# Patient Record
Sex: FEMALE | URBAN - METROPOLITAN AREA
[De-identification: names, ages, dates, MRNs, and addresses within clinical notes are randomized per-mention and may not be internally consistent; named-entity substitution may affect disease eponyms.]

---

## 2018-11-30 ENCOUNTER — APPOINTMENT (OUTPATIENT)
Age: 48
Setting detail: DERMATOLOGY
End: 2018-12-01

## 2018-11-30 PROBLEM — C44.91 BASAL CELL CARCINOMA OF SKIN, UNSPECIFIED: Status: ACTIVE | Noted: 2018-11-30

## 2018-11-30 PROCEDURE — OTHER MOHS SURGERY PHONE CONSULTATION: OTHER

## 2018-11-30 NOTE — HPI: MOHS SURGERY CONSULTATION
Additional History: She plans to arrive alone and will perform her own wound care.\\n\\nReferred by Milli Carranza MD

## 2018-12-19 ENCOUNTER — APPOINTMENT (OUTPATIENT)
Age: 48
Setting detail: DERMATOLOGY
End: 2018-12-20

## 2018-12-19 DIAGNOSIS — Z71.89 OTHER SPECIFIED COUNSELING: ICD-10-CM

## 2018-12-19 PROBLEM — C44.319 BASAL CELL CARCINOMA OF SKIN OF OTHER PARTS OF FACE: Status: ACTIVE | Noted: 2018-12-19

## 2018-12-19 PROCEDURE — OTHER DIAGNOSIS COMMENT: OTHER

## 2018-12-19 PROCEDURE — OTHER RETURN TO REFERRING PROVIDER: OTHER

## 2018-12-19 PROCEDURE — OTHER CONSULTATION FOR MOHS SURGERY: OTHER

## 2018-12-19 PROCEDURE — 17311 MOHS 1 STAGE H/N/HF/G: CPT

## 2018-12-19 PROCEDURE — OTHER MOHS SURGERY: OTHER

## 2018-12-19 PROCEDURE — OTHER COUNSELING: OTHER

## 2018-12-19 PROCEDURE — 13132 CMPLX RPR F/C/C/M/N/AX/G/H/F: CPT

## 2018-12-19 PROCEDURE — OTHER MIPS QUALITY: OTHER

## 2018-12-19 NOTE — PROCEDURE: CONSULTATION FOR MOHS SURGERY
Incorporate Mauc In Note: No
Detail Level: Detailed
Size Of Lesion: 0.7
Body Location Override (Optional - Billing Will Still Be Based On Selected Body Map Location If Applicable): Right Lateral Superior Forehead
Name Of The Referring Provider For Procedure: Milli Carranza MD
X Size Of Lesion In Cm (Optional): 0.5

## 2018-12-19 NOTE — PROCEDURE: MIPS QUALITY
Detail Level: Detailed
Quality 110: Preventive Care And Screening: Influenza Immunization: Influenza Immunization previously received during influenza season
Quality 431: Preventive Care And Screening: Unhealthy Alcohol Use - Screening: Patient screened for unhealthy alcohol use using a single question and scores less than 2 times per year
Quality 143: Oncology: Medical And Radiation- Pain Intensity Quantified: Pain severity quantified, no pain present
Quality 226: Preventive Care And Screening: Tobacco Use: Screening And Cessation Intervention: Patient screened for tobacco and never smoked
Quality 130: Documentation Of Current Medications In The Medical Record: Current Medications Documented

## 2018-12-19 NOTE — PROCEDURE: MOHS SURGERY
Post-Care Instructions: The patient was provided with detailed verbal and written instructions for daily wound care, including use of H2O2 cleansing, followed by application of plain Vaseline and a bandage.  These instructions including Dr. Livingston's contact information should there be any questions or concerns.  The patient is not to engage in any heavy lifting, exercise, or swimming for the next week.  Should the patient develop any fevers, chills, bleeding, or pain not controlled by OTC analgesics, s/he should contact the office immediately.

## 2021-03-15 ENCOUNTER — TRANSCRIBE ORDERS (OUTPATIENT)
Dept: OTHER | Age: 51
End: 2021-03-15

## 2021-03-15 DIAGNOSIS — L71.9 ROSACEA: Primary | ICD-10-CM

## 2022-09-26 ENCOUNTER — TRANSFERRED RECORDS (OUTPATIENT)
Dept: HEALTH INFORMATION MANAGEMENT | Facility: CLINIC | Age: 52
End: 2022-09-26

## 2022-10-03 ENCOUNTER — TRANSFERRED RECORDS (OUTPATIENT)
Dept: HEALTH INFORMATION MANAGEMENT | Facility: CLINIC | Age: 52
End: 2022-10-03

## 2022-10-07 ENCOUNTER — TRANSFERRED RECORDS (OUTPATIENT)
Dept: HEALTH INFORMATION MANAGEMENT | Facility: CLINIC | Age: 52
End: 2022-10-07

## 2022-10-10 ENCOUNTER — TRANSFERRED RECORDS (OUTPATIENT)
Dept: HEALTH INFORMATION MANAGEMENT | Facility: CLINIC | Age: 52
End: 2022-10-10

## 2022-10-11 ENCOUNTER — MEDICAL CORRESPONDENCE (OUTPATIENT)
Dept: HEALTH INFORMATION MANAGEMENT | Facility: CLINIC | Age: 52
End: 2022-10-11

## 2022-10-12 ENCOUNTER — TRANSFERRED RECORDS (OUTPATIENT)
Dept: HEALTH INFORMATION MANAGEMENT | Facility: CLINIC | Age: 52
End: 2022-10-12

## 2022-10-20 ENCOUNTER — TRANSCRIBE ORDERS (OUTPATIENT)
Dept: OTHER | Age: 52
End: 2022-10-20

## 2022-10-20 DIAGNOSIS — Z47.89 AFTERCARE FOLLOWING SURGERY OF THE MUSCULOSKELETAL SYSTEM: Primary | ICD-10-CM

## 2022-10-21 ENCOUNTER — MEDICAL CORRESPONDENCE (OUTPATIENT)
Dept: HEALTH INFORMATION MANAGEMENT | Facility: CLINIC | Age: 52
End: 2022-10-21

## 2022-10-21 ENCOUNTER — TELEPHONE (OUTPATIENT)
Dept: ORTHOPEDICS | Facility: CLINIC | Age: 52
End: 2022-10-21

## 2022-10-21 NOTE — TELEPHONE ENCOUNTER
Called and spoke to patient. Patient informed me that she expects to be discharged on 11/1/22. Scheduled patient to see Dr. Honeycutt on 11/7/22. All questions were answered. Encouraged patient to call with any questions or concerns.    CHUCKIE Zazueta

## 2022-10-21 NOTE — TELEPHONE ENCOUNTER
Hello,  I'm with ortho con.  Pt has a referral to Dr. Honeycutt.  She has a periprosthetic knee infection following surgery.  Shes been hospitalized for 6 weeks on antibiotics.  I dont find a long enough appointment in the [facility] future, can you help?  She has referrals coming for two other depts too one of which would determine if she has a metal allergy.  Thank you.

## 2022-10-25 ENCOUNTER — TRANSCRIBE ORDERS (OUTPATIENT)
Dept: OTHER | Age: 52
End: 2022-10-25

## 2022-10-25 DIAGNOSIS — Z47.89 AFTERCARE FOLLOWING SURGERY OF THE MUSCULOSKELETAL SYSTEM: Primary | ICD-10-CM

## 2022-10-28 NOTE — TELEPHONE ENCOUNTER
FUTURE VISIT INFORMATION      FUTURE VISIT INFORMATION:    Date: 11.2.22    Time: 11:00    Location: Video  REFERRAL INFORMATION:    Referring provider:  Carlito    Referring providers clinic:  [facility]    Reason for visit/diagnosis  Metal allergy testing    RECORDS REQUESTED FROM:       Clinic name Comments Records Status   [facility] 10.3.22  Carlito CARDOSO

## 2022-11-01 ENCOUNTER — TRANSFERRED RECORDS (OUTPATIENT)
Dept: HEALTH INFORMATION MANAGEMENT | Facility: CLINIC | Age: 52
End: 2022-11-01

## 2022-11-01 NOTE — PROGRESS NOTES
"[facility] Dermato-allergology Note  Virtual visit: store and forward video ([employer] connected), start time: 1145, end time: 1215, date of images: n/a  Encounter Date: Nov 2, 2022  ____________________________________________    CC: Allergy Consult (Nayely is a video visit for possible metal allergy. Knee replacements in 2016. Infection in left knee joint Sept 2022, unknown cause (culture no answers, ). Per pt high inflammatory markers entire life./Has seen allergy but no answers to why facial and tongue swelling.)      HPI:  (Nov 2, 2022)  Ms. Nayely Wesley is a(n) 52 year old female who presents today as new patient for allergy consultation  - In the end of 2016 had bilateral knee replacement   - Has high inflammatory markers   - When she went to have knees replaced, orthopedic surgeon said that he thought she had autoimmune disease   - Since knees replaced in 2016, both legs are burning at night almost every night   - \"Feels like something attacking me from inside out\"  - Knees have been okay   - Over the years, symmetric arthritis especially hips over the summer   - In September they went to a [employer] festival and woke up at night and this night it was the worst she ever felt   - Legs feel objectively \"burning and on fire\"  - At one point left knee was swollen up like a basketball   - They adam fluid off the leg but WBC was 50,000 so they did an emergency surgery  - status post left knee irrigation and debridement, left total knee explant, and antibiotic spacer placement preformed on 9/19/2022  - otherwise feeling well in usual state of health    Family history of lupus   > 8 miscarriages   History of polymalgia rheumatica     No eczema, no history of asthma or seasonal allergies   History of skin cancer    Never had a problem with fashion jewelery or metals   Recently has had some reaction to adhesives     Physical exam:  No photos available.     Past Medical History:   There is no problem list on " file for this patient.    No past medical history on file.    Allergies:  Not on File    Medications:  No current outpatient medications on file.     No current facility-administered medications for this visit.       Social History:  The patient worked as a senior travel planner.     Family History:  No family history on file.    Previous Labs, Allergy Tests, Dermatopathology, Imaging:  Recent BMP shows elevated Cr 1.10 with reduced GFR  WBC normal, Hgb low at 9.4   CRP 3.9 ESR >89        Referred By: No referring provider defined for this encounter.     Allergy Tests:    Past Allergy Test    Order for Future Allergy Testing:    [] Outpatient  [] Inpatient: [facility]..../ Bed ....       Skin Atopy (atopic dermatitis) [] Yes   [x] No .........  Contact allergies:   [x] Yes   [] No .implant material?.  Hand eczema:   [] Yes   [x] No           Leading hand:   [] R   [] L       [] Ambidextrous         Drug allergies:        [] Yes   [x] No  which?......    Urticaria/Angioedema  [] Yes   [x] No .........  Food Allergy:  [] Yes   [x] No  which?......  Pets :  [] Yes   [x] No  which?......         []  Rhinitis   [] Conjunctivitis   [] Sinusitis   [] Polyposis   [] Otitis   [] Pharyngitis         []  Postnasal drip    [x]  none  Operations:   [] Tonsils   [] Septum   [] Sinus   [] Polyposis        [] Asthma bronchiale   [] Coughing      []  none  Symptoms (mostly Rhinoconjunctivitis and Asthma) aggravated by:  Season   [] I   [] II   [] III   [] IV   []V   []VI   []VII   []VIII   []IX   []X   []XI   []XII     [] perennial   Day time      [] morning   [] noon      [] evening        [] night    [] whole day........  []  none  Location/changes    [] inside        [] outside   [] mountains    [] sea     [] others.............   []  none  Triggers, specific     [] animals     [] plants     [] dust              [] others ...........................    []  none  Triggers, others       [] work          [] psyche    [] sport             [] others .............................  []  none  Irritant                [] phys efforts [] smoke    [] heat/cold     [] odors  []others............... []  none    Order for PATCH TESTS  Reason for tests (suspected allergy): reaction to implant material?  Known previous allergies: none  Standardized panels  [x] Standard panel (40 tests)  [x] Preservatives & Antimicrobials (31 tests)  [] Emulsifiers & Additives (25 tests)   [] Perfumes/Flavours & Plants (25 tests)  [] Hairdresser panel (12 tests)   [] Rubber Chemicals (22 tests)  [x] Plastics (26 tests)  [] Colorants/Dyes/Food additives (20 tests)  [x] Metals (implants/dental) (24 tests)  [] Local anaesthetics/NSAIDs (13 tests)  [x] Antibiotics & Antimycotics (14 tests)   [] Corticosteroids (15 tests)   [] Photopatch test (62 tests)   [] others: ...      [] Patient's own products: ...    DO NOT test if chemical or biological identity is unknown!     always ask from patient the product information and safety sheets (MSDS)       Order for PRICK TESTS    Reason for tests (suspected allergy): not necessary  Known previous allergies: n/a    Standardized prick panels  [] Atopic panel (20 tests)  [] Pediatric Panel (12 tests)  [] Milk, Meat, Eggs, Grains (20 tests)   [] Dust, Epithelia, Feathers (10 tests)  [] Fish, Seafood, Shellfish (17 tests)  [] Nuts, Beans (14 tests)  [] Spice, Vegetable, Fruit (17 tests)  [] Pollen Panel = Tree, Grass, Weed (24 tests)  [] Others: ...      [] Patient's own products: ...    DO NOT test if chemical or biological identity is unknown!     always ask from patient the product information and safety sheets (MSDS)     Standardized intradermal tests  [] Penicillium notatum [] Aspergillus fumigatus [] House dust mites D.far & D. pteron  [] Cat    [] dog  [] Others: ...  [] Bee venom   [] Wasp venom  !!Specific protocol with dilutions!!       Order for Drug allergy tests (prick & Intradermal & patch tests)    [] Penicillin G  [] Ampicillin []  Cefazolin   [] Ceftriaxone   [] Ceftazidime  [] Bactrim    [] Others: ...  Order for ... as test date    [] Patient needs consultation with Allergy team (changes of tests may apply)  [x] Tests discussed with Allergy team (can have direct appointment for allergy tests)     ________________________________    Assessment & Plan:    ==> Final Diagnosis:     # Possible allergy to metal, cement, or other materials used in knee replacement.   - Patch testing (will need someone to read for her on Wednesday b/c lives 3 hours away)   * chronic illness with exacerbation, progression, side effects from treatment      These conclusions are made at the best of one's knowledge and belief based on the provided evidence such as patient's history and allergy test results and they can change over time or can be incomplete because of missing information's.    ==> Treatment Plan:  Monday (in person), Wednesday (photos), Friday (in person)     Staff and Resident:  Provider    Olinda Garzon MD     Staff Physician Comments:  I saw and evaluated the patient with the resident and I agree with the assessment and plan as documented in the resident's note.    Royce De Leon MD  Professor  Head of Dermato-Allergy Division  Department of Dermatology  [facility]      Follow-up in Derm-Allergy clinic for patch testing     I spent a total of 30 minutes with Nayely Wesley. This time was spent counseling the patient and/or coordinating care, explaining the allergy tests

## 2022-11-01 NOTE — TELEPHONE ENCOUNTER
Action November 1, 2022 4:02 PM MT   Action Taken Sent a request for images from [facility] [phone] and [facility] [phone].     Action November 4, 2022 10:55 AM MT   Action Taken Images from [facility] received and resovled to PACS by another user. Sent a request to [facility] for missing image [phone], with Fedex label: [id number].       DIAGNOSIS: periprosthetic LT knee infection following surgery   APPOINTMENT DATE: 11/07/2022   NOTES STATUS DETAILS   OFFICE NOTE from referring provider Care Everywhere 10/03/2022 - Ramu Esteban MD - [facility] Ortho   OFFICE NOTE from other specialist Care Everywhere 05/05/2022 - Ethan Rascon MD - [facility] Rheumatology   DISCHARGE SUMMARY from hospital Care Everywhere 09/18/2022 to 09/26/2022 - [facility]   OPERATIVE REPORT Care Everywhere 09/19/2022 -  LT Knee Irrigation and Debridement, Total Explant, and Antibiotic Spacer Placement   MEDICATION LIST Care Everywhere    IMPLANT RECORD/STICKER Care Everywhere    LABS     CULTURES Care Everywhere 09/19/2022, 09/18/2022   ULTRASOUND PACS [facility]:  10/10/2022 - LT Lower Extremity  09/18/2022 - LT Knee Arthrocentesis   03/17/2017 - LT Lower Extremity   XRAYS (IMAGES & REPORTS) PACS [facility]:  10/03/2022, 09/19/2022, 09/18/2022, 12/28/2020, 12/27/2018, 11/27/2017, 06/15/2017, 03/23/2017,   More.. - LT Knee  [facility]:  01/19/2016 - Leg Length

## 2022-11-02 ENCOUNTER — VIRTUAL VISIT (OUTPATIENT)
Dept: ALLERGY | Facility: CLINIC | Age: 52
End: 2022-11-02
Payer: COMMERCIAL

## 2022-11-02 ENCOUNTER — PRE VISIT (OUTPATIENT)
Dept: ALLERGY | Facility: CLINIC | Age: 52
End: 2022-11-02

## 2022-11-02 DIAGNOSIS — L23.89 ALLERGIC CONTACT DERMATITIS DUE TO OTHER AGENTS: Primary | ICD-10-CM

## 2022-11-02 PROCEDURE — 99203 OFFICE O/P NEW LOW 30 MIN: CPT | Mod: 95 | Performed by: DERMATOLOGY

## 2022-11-02 NOTE — Clinical Note
"    11/2/2022         RE: Nayely Wesley  [id number]  [place] [facility] [address]        Dear Colleague,    Thank you for referring your patient, Nayely Wesley, to the [facility] [place]. Please see a copy of my visit note below.    [facility] Dermato-allergology Note  Virtual visit: store and forward video ([employer]t connected), start time: 1145, end time: 1215, date of images: n/a  Encounter Date: Nov 2, 2022  ____________________________________________    CC: Allergy Consult (Nayely is a video visit for possible metal allergy. Knee replacements in 2016. Infection in left knee joint Sept 2022, unknown cause (culture no answers, ). Per pt high inflammatory markers entire life./Has seen allergy but no answers to why facial and tongue swelling.)      HPI:  (Nov 2, 2022)  Ms. Nayely Wesley is a(n) 52 year old female who presents today as new patient for allergy consultation  - In the end of 2016 had bilateral knee replacement   - Has high inflammatory markers   - When she went to have knees replaced, orthopedic surgeon said that he thought she had autoimmune disease   - Since knees replaced in 2016, both legs are burning at night almost every night   - \"Feels like something attacking me from inside out\"  - Knees have been okay   - Over the years, symmetric arthritis especially hips over the summer   - In September they went to a [employer] festival and woke up at night and this night it was the worst she ever felt   - Legs feel objectively \"burning and on fire\"  - At one point left knee was swollen up like a basketball   - They adam fluid off the leg but WBC was 50,000 so they did an emergency surgery  - status post left knee irrigation and debridement, left total knee explant, and antibiotic spacer placement preformed on 9/19/2022  - otherwise feeling well in usual state of health    Family history of lupus   > 8 miscarriages   History of polymalgia rheumatica     No eczema, no " history of asthma or seasonal allergies   History of skin cancer    Never had a problem with fashion jewelery or metals   Recently has had some reaction to adhesives     Physical exam:  No photos available.     Past Medical History:   There is no problem list on file for this patient.    No past medical history on file.    Allergies:  Not on File    Medications:  No current outpatient medications on file.     No current facility-administered medications for this visit.       Social History:  The patient worked as a senior travel planner.     Family History:  No family history on file.    Previous Labs, Allergy Tests, Dermatopathology, Imaging:  Recent BMP shows elevated Cr 1.10 with reduced GFR  WBC normal, Hgb low at 9.4   CRP 3.9 ESR >89        Referred By: No referring provider defined for this encounter.     Allergy Tests:    Past Allergy Test    Order for Future Allergy Testing:    [] Outpatient  [] Inpatient: [facility]..../ Bed ....       Skin Atopy (atopic dermatitis) [] Yes   [] No .........  Contact allergies:   [] Yes   [] No ..........  Hand eczema:   [] Yes   [] No           Leading hand:   [] R   [] L       [] Ambidextrous         Drug allergies:        [] Yes   [] No  which?......    Urticaria/Angioedema  [] Yes   [] No .........  Food Allergy:  [] Yes   [] No  which?......  Pets :  [] Yes   [] No  which?......         []  Rhinitis   [] Conjunctivitis   [] Sinusitis   [] Polyposis   [] Otitis   [] Pharyngitis         []  Postnasal drip    []  none  Operations:   [] Tonsils   [] Septum   [] Sinus   [] Polyposis        [] Asthma bronchiale   [] Coughing      []  none  Symptoms (mostly Rhinoconjunctivitis and Asthma) aggravated by:  Season   [] I   [] II   [] III   [] IV   []V   []VI   []VII   []VIII   []IX   []X   []XI   []XII     [] perennial   Day time      [] morning   [] noon      [] evening        [] night    [] whole day........  []  none  Location/changes    [] inside        [] outside   [] mountains     [] sea     [] others.............   []  none  Triggers, specific     [] animals     [] plants     [] dust              [] others ...........................    []  none  Triggers, others       [] work          [] psyche    [] sport            [] others .............................  []  none  Irritant                [] phys efforts [] smoke    [] heat/cold     [] odors  []others............... []  none    Order for PATCH TESTS  Reason for tests (suspected allergy): ***  Known previous allergies: ***  Standardized panels  [x] Standard panel (40 tests)  [x] Preservatives & Antimicrobials (31 tests)  [] Emulsifiers & Additives (25 tests)   [] Perfumes/Flavours & Plants (25 tests)  [] Hairdresser panel (12 tests)   [] Rubber Chemicals (22 tests)  [x] Plastics (26 tests)  [] Colorants/Dyes/Food additives (20 tests)  [x] Metals (implants/dental) (24 tests)  [] Local anaesthetics/NSAIDs (13 tests)  [x] Antibiotics & Antimycotics (14 tests)   [] Corticosteroids (15 tests)   [] Photopatch test (62 tests)   [] others: ...      [] Patient's own products: ...    DO NOT test if chemical or biological identity is unknown!     always ask from patient the product information and safety sheets (MSDS)       Order for PRICK TESTS    Reason for tests (suspected allergy): ***  Known previous allergies: ***    Standardized prick panels  [] Atopic panel (20 tests)  [] Pediatric Panel (12 tests)  [] Milk, Meat, Eggs, Grains (20 tests)   [] Dust, Epithelia, Feathers (10 tests)  [] Fish, Seafood, Shellfish (17 tests)  [] Nuts, Beans (14 tests)  [] Spice, Vegetable, Fruit (17 tests)  [] Pollen Panel = Tree, Grass, Weed (24 tests)  [] Others: ...      [] Patient's own products: ...    DO NOT test if chemical or biological identity is unknown!     always ask from patient the product information and safety sheets (MSDS)     Standardized intradermal tests  [] Penicillium notatum [] Aspergillus fumigatus [] House dust mites DMindyfar & DMindy pteron  []  Cat    [] dog  [] Others: ...  [] Bee venom   [] Wasp venom  !!Specific protocol with dilutions!!       Order for Drug allergy tests (prick & Intradermal & *** patch tests)    [] Penicillin G  [] Ampicillin [] Cefazolin   [] Ceftriaxone   [] Ceftazidime  [] Bactrim    [] Others: ...  Order for ... as test date    [] Patient needs consultation with Allergy team (changes of tests may apply)  [] Tests discussed with Allergy team (can have direct appointment for allergy tests)     ________________________________    Assessment & Plan:    ==> Final Diagnosis:     # Possible allergy to metal, cement, or other materials used in knee replacement.   - Patch testing (will need someone to read for her on Wednesday b/c lives 3 hours away)   {jgstatus:694450}    # ***  {jgstatus:701212}    # ***  {jgstatus:130844}  {jgallergytestfinal:659520}  - ***    These conclusions are made at the best of one's knowledge and belief based on the provided evidence such as patient's history and allergy test results and they can change over time or can be incomplete because of missing information's.    ==> Treatment Plan:  Monday (in person), Wednesday (photos), Friday (in person)    Procedures Performed: Allergy tests, including prick, intradermal and patch tests, drug allergy or provocation tests***     Staff and Resident:  Provider    Olinda Garzon MD     Follow-up in Derm-Allergy clinic for patch testing     I spent a total of [5:10:15:20:25:30:35:40:45:50:55:60:***] minutes with Nayely Wesley. This time was spent counseling the patient and/or coordinating care, explaining the allergy tests *** or procedures, performing allergy tests and assessing the clinical relevance.        Again, thank you for allowing me to participate in the care of your patient.        Sincerely,        Royce De Leon MD

## 2022-11-04 ENCOUNTER — DOCUMENTATION ONLY (OUTPATIENT)
Dept: ORTHOPEDICS | Facility: CLINIC | Age: 52
End: 2022-11-04

## 2022-11-04 ENCOUNTER — TELEPHONE (OUTPATIENT)
Dept: ALLERGY | Facility: CLINIC | Age: 52
End: 2022-11-04

## 2022-11-04 NOTE — TELEPHONE ENCOUNTER
[facility] Call Center    Phone Message    May a detailed message be left on voicemail: yes     Reason for Call: Appointment Intake    Referring Provider Name: NA   Diagnosis and/or Symptoms: Patch test   Pt had Appt 11/02/22 and was told she will need to schedule an Appt for next week Monday in clinic Wednesday virtually and Friday in clinic.   Pt would like to schedule with her local Allergy to help take off patches with Dr. Declan Brooks [facility] [address] [facility] [address] phone [phone]  Please call pt to discuss. ASAP   Need week of 11/07/22  Thanks     Action Taken: Message routed to:  [facility] Allergy    Travel Screening: Not Applicable

## 2022-11-04 NOTE — PROGRESS NOTES
Surgical Hx     TOTAL KNEE ARTHROPLASTY Bilateral 12/19/2016   Procedure: ARTHROPLASTY TOTAL KNEE BILATERAL; Surgeon: Royce Lugo MD; Location: [facility] MAIN OR     TOTAL KNEE ARTHROPLASTY Left 9/19/2022   Procedure: LEFT KNEE IRRIGATION AND DEBRIDEMENT, TOTAL EXPLANT AND ANTIBIOTIC SPACER PLACEMENT; Surgeon: Ramu Esteban MD; Location: [facility] OR     left total knee explant, and antibiotic spacer placement preformed on 9/19/2022     Frederick Robin, EMT on 11/4/2022 at 11:06 AM

## 2022-11-04 NOTE — TELEPHONE ENCOUNTER
Received call from call center. Informed that writer would inform Allergy pool. Informed allergy nursing staff of patient request. States they will call her when able.    See visit from 11/2 for more information.    TE already sent to allergy pool. Will not re-send at this time.    Arthur Pinto, EMT

## 2022-11-07 ENCOUNTER — OFFICE VISIT (OUTPATIENT)
Dept: ORTHOPEDICS | Facility: CLINIC | Age: 52
End: 2022-11-07
Payer: COMMERCIAL

## 2022-11-07 ENCOUNTER — PRE VISIT (OUTPATIENT)
Dept: ORTHOPEDICS | Facility: CLINIC | Age: 52
End: 2022-11-07

## 2022-11-07 VITALS — BODY MASS INDEX: 43.39 KG/M2 | WEIGHT: 270 LBS | HEIGHT: 66 IN

## 2022-11-07 DIAGNOSIS — E66.01 MORBID OBESITY (H): ICD-10-CM

## 2022-11-07 DIAGNOSIS — Z47.89 AFTERCARE FOLLOWING SURGERY OF THE MUSCULOSKELETAL SYSTEM: ICD-10-CM

## 2022-11-07 DIAGNOSIS — T84.54XA INFECTION OF TOTAL LEFT KNEE REPLACEMENT, INITIAL ENCOUNTER (H): Primary | ICD-10-CM

## 2022-11-07 PROCEDURE — 99204 OFFICE O/P NEW MOD 45 MIN: CPT | Mod: GC | Performed by: ORTHOPAEDIC SURGERY

## 2022-11-07 RX ORDER — METFORMIN HCL 500 MG
500 TABLET, EXTENDED RELEASE 24 HR ORAL DAILY
COMMUNITY
Start: 2022-03-07

## 2022-11-07 RX ORDER — LOSARTAN POTASSIUM 25 MG/1
1 TABLET ORAL DAILY
COMMUNITY
Start: 2022-08-01

## 2022-11-07 RX ORDER — ASPIRIN 325 MG
325 TABLET ORAL
COMMUNITY
Start: 2022-10-10

## 2022-11-07 RX ORDER — AMOXICILLIN 250 MG
2 CAPSULE ORAL
COMMUNITY
Start: 2022-11-01

## 2022-11-07 RX ORDER — AMOXICILLIN 500 MG/1
500 CAPSULE ORAL
Qty: 4 CAPSULE | Refills: 0 | Status: SHIPPED | OUTPATIENT
Start: 2022-11-07

## 2022-11-07 RX ORDER — METHOCARBAMOL 500 MG/1
500-1000 TABLET, FILM COATED ORAL
COMMUNITY
Start: 2021-03-04

## 2022-11-07 RX ORDER — CALCIUM CARBONATE 300MG(750)
400 TABLET,CHEWABLE ORAL
COMMUNITY

## 2022-11-07 RX ORDER — PREDNISONE 5 MG/1
1 TABLET ORAL DAILY
COMMUNITY
Start: 2021-01-12

## 2022-11-07 RX ORDER — TRAZODONE HYDROCHLORIDE 50 MG/1
1 TABLET, FILM COATED ORAL AT BEDTIME
COMMUNITY
Start: 2021-02-08

## 2022-11-07 RX ORDER — BUSPIRONE HYDROCHLORIDE 15 MG/1
15 TABLET ORAL
COMMUNITY

## 2022-11-07 RX ORDER — HYDROXYZINE PAMOATE 25 MG/1
CAPSULE ORAL
COMMUNITY
Start: 2022-11-01

## 2022-11-07 RX ORDER — OXYCODONE HYDROCHLORIDE 5 MG/1
TABLET ORAL
COMMUNITY
Start: 2022-11-01

## 2022-11-07 RX ORDER — SPIRONOLACTONE 25 MG/1
TABLET ORAL
COMMUNITY
Start: 2022-10-28

## 2022-11-07 RX ORDER — CETIRIZINE HYDROCHLORIDE 10 MG/1
10 TABLET ORAL 4 TIMES DAILY
COMMUNITY
Start: 2022-01-16

## 2022-11-07 RX ORDER — TORSEMIDE 20 MG/1
40 TABLET ORAL
COMMUNITY
Start: 2022-06-06

## 2022-11-07 ASSESSMENT — ACTIVITIES OF DAILY LIVING (ADL): FUNCTION,_DAILY_LIVING_SCORE: 44.12

## 2022-11-07 ASSESSMENT — KOOS JR: HOW SEVERE IS YOUR KNEE STIFFNESS AFTER FIRST WAKING IN MORNING: MILD

## 2022-11-07 NOTE — LETTER
11/7/2022         RE: Nayely Wesley  [address] [facility] [address]        Dear Colleague,    Thank you for referring your patient, Nayely Wesley, to the [facility]. Please see a copy of my visit note below.        [facility]  Orthopaedic Surgery, Joint Replacement Consultation  by Axel Honeycutt M.D.    Nayely Wesley MRN# [id number]    YOB: 1970     Requesting physician: Ramu Esteban  No primary care provider on file.            Assessment and Plan:   Assessment:  52-year-old female with left knee PJI status post explant and insertion of spacer.  She is currently on week 1 of antibiotic holiday     Plan:  1. Toe-touch weightbearing left lower extremity  2. Counseled the patient on the timeline of optimal treatment  3. We would like her to be on 8 weeks of antibiotic holiday  4. After the completion of antibiotic holiday, we will aspirate the knee.  If the aspiration is negative for infection, then we will proceed to the second stage of reimplantation.     Fabiano Ortiz MD  Adult Reconstructive Surgery Fellow  Department of Orthopaedic Surgery, [facility]               History of Present Illness:   52 year old female presents to the office today with a left knee PJI.  She will originally had bilateral TKA on 12/19/2016.  On 9/18/2022, she presented to the ER with concerns for left knee PJI.  Her knee was aspirated and she underwent surgery.  I&D and polyexchange was planned, but it was determined that her components were loose and an explant and insertion of antibiotic spacer was performed.  The patient experienced no complications.  No organisms grew from the cultures.  The patient had 6 weeks of vancomycin and ceftriaxone.  It has been 1 week since she was last on antibiotics.    Currently, the patient is in a knee immobilizer.  She is anxious and hopeful for the next steps.  She currently does not have any fevers or chills.   "Denies any numbness or tingling.  Denies any other concerns.      Background history:  DX:  1. Bilateral knee osteoarthritis  2. Left knee PJI    TREATMENTS:  1. 12/19/2016, bilateral TKA ([facility]), EH  2. 9/18/2022, L knee aspiration ([facility]), EH: 53k wbc, 90% neutrophils, no growth  3. 9/19/2022: L TKA explant, insertion of spacer (Lesmeister), EH           Physical Exam:     EXAMINATION pertinent findings:   PSYCH: Pleasant, healthy-appearing, alert, oriented x3, cooperative. Normal mood and affect.  VITAL SIGNS: Height 1.676 m (5' 6\"), weight 122.5 kg (270 lb)..  Reviewed nursing intake notes.   Body mass index is 43.58 kg/m .  RESP: non labored breathing   ABD: benign, soft, non-tender, no acute peritoneal findings  SKIN: grossly normal   LYMPHATIC: grossly normal, no adenopathy, no extremity edema  NEURO: grossly normal , no motor deficits  VASCULAR: satisfactory perfusion of all extremities   MUSCULOSKELETAL:   LLE:  In knee immobilizer  Skin intact  Incision well-healed  No ecchymosis or abrasions  No erythema, warmth, or effusion  No TTP  Fires TA/GS/P/FHL/EHL  SILT SP/DP/Saph/Sural/T  WWP, 2+ DP pulse               Data:   All laboratory data reviewed  All imaging studies reviewed by me       X-rays of the left knee from 10/31/2022 reviewed in the office showing left knee with antibiotic spacer in good position.        DATA for DOCUMENTATION:         Past Medical History:   There is no problem list on file for this patient.    No past medical history on file.    Also see scanned health assessment forms.       Past Surgical History:   No past surgical history on file.         Social History:     Social History     Socioeconomic History     Marital status:      Spouse name: Not on file     Number of children: Not on file     Years of education: Not on file     Highest education level: Not on file   Occupational History     Not on file   Tobacco Use     Smoking status: Not on file     Smokeless " tobacco: Not on file   Substance and Sexual Activity     Alcohol use: Not on file     Drug use: Not on file     Sexual activity: Not on file   Other Topics Concern     Not on file   Social History Narrative     Not on file     Social Determinants of Health     Financial Resource Strain: Not on file   Food Insecurity: Not on file   Transportation Needs: Not on file   Physical Activity: Not on file   Stress: Not on file   Social Connections: Not on file   Intimate Partner Violence: Not on file   Housing Stability: Not on file            Family History:     No family history on file.         Medications:     Current Outpatient Medications   Medication Sig     aspirin (ASA) 325 MG tablet Take 325 mg by mouth     busPIRone (BUSPAR) 15 MG tablet Take 15 mg by mouth     cetirizine (ZYRTEC) 10 MG tablet Take 10 mg by mouth 4 times daily     cholecalciferol 50 MCG (2000 UT) CAPS Take 2,000 Units by mouth     hydrOXYzine (VISTARIL) 25 MG capsule TAKE 1 CAPSULE BY MOUTH THREE TIMES DAILY AS NEEDED FOR MUSCLE CRAMPS     losartan (COZAAR) 25 MG tablet Take 1 tablet by mouth daily     Magnesium 400 MG TABS Take 400 mg by mouth     metFORMIN (GLUCOPHAGE XR) 500 MG 24 hr tablet Take 500 mg by mouth     methocarbamol (ROBAXIN) 500 MG tablet Take 500-1,000 mg by mouth     oxyCODONE (ROXICODONE) 5 MG tablet TAKE 1 TO 2 TABLETS BY MOUTH EVERY 4 HOURS AS NEEDED FOR PAIN     POTASSIUM CHLORIDE ER PO Take 1 tablet by mouth     predniSONE (DELTASONE) 5 MG tablet Take 1 tablet by mouth daily     Probiotic Product (PROBIOTIC-10 PO) Take 1 capsule by mouth daily     senna-docusate (SENOKOT-S/PERICOLACE) 8.6-50 MG tablet Take 2 tablets by mouth     spironolactone (ALDACTONE) 25 MG tablet TAKE 2 TABLETS BY MOUTH ONE TIME A DAY     torsemide (DEMADEX) 20 MG tablet Take 40 mg by mouth     linaclotide (LINZESS) 290 MCG capsule Take 290 mcg by mouth (Patient not taking: Reported on 11/7/2022)     traZODone (DESYREL) 50 MG tablet Take 1 tablet by  mouth At Bedtime (Patient not taking: Reported on 11/7/2022)     No current facility-administered medications for this visit.              Review of Systems:   A comprehensive 10 point review of systems (constitutional, ENT, cardiac, peripheral vascular, lymphatic, respiratory, GI, , Musculoskeletal, skin, Neurological) was performed and found to be negative except as described in this note.       HOOS Hip Dysfunction & Osteoarthritis Outcome Questionnaire    No flowsheet data found.           [unfilled]    KOOS Knee Survey Assessment    No flowsheet data found.           Promis 10 Assessment    No flowsheet data found.           Ortho Oxford Knee Questionnaire    No flowsheet data found.             See intake form completed by patient          [facility]  Orthopaedic Surgery, Joint Replacement Consultation  by Axel Honeycutt M.D.    Nayely Wesley MRN# [id number]    YOB: 1970     Requesting physician: Ramu Esteban  No primary care provider on file.            Assessment and Plan:   Assessment:  ***     Plan:  ***                 For additional information and frequently asked questions regarding joint replacements, scan the QR code image below on your phone camera:     or go to:   [email or web address]                  History of Present Illness:   52 year old female  chief complaint      Current symptoms:  Problem: Left knee periprosthetic knee infection  Onset and duration: Knee swelling and burning started on 09/18/2022  Awakens from sleep due to sx's:  Yes  Precipitating Injury:  No    Other joints or sites painful:  Yes, Bilateral hips and ankles      Background history:  DX:  3. ***    TREATMENTS:  4. December 19, 2016, Bilateral TKA, (Dr. Royce Lugo), [facility] orth[facility]ics ([facility]).  4. September 19, 2022,  Left knee irrigation and debriedment, Total explant and antibiotic spacer placement, (Dr. Rodriguez) [facility]  "Orthopedics               Physical Exam:     EXAMINATION pertinent findings:   PSYCH: Pleasant, healthy-appearing, alert, oriented x3, cooperative. Normal mood and affect.  VITAL SIGNS: Height 1.676 m (5' 6\"), weight 122.5 kg (270 lb)..  Reviewed nursing intake notes.   Body mass index is 43.58 kg/m .  RESP: non labored breathing   ABD: benign, soft, non-tender, no acute peritoneal findings  SKIN: grossly normal   LYMPHATIC: grossly normal, no adenopathy, no extremity edema  NEURO: grossly normal , no motor deficits  VASCULAR: satisfactory perfusion of all extremities   MUSCULOSKELETAL:   ***                  Data:   All laboratory data reviewed  All imaging studies reviewed by me               DATA for DOCUMENTATION:         Past Medical History:   There is no problem list on file for this patient.    No past medical history on file.    Also see scanned health assessment forms.       Past Surgical History:   No past surgical history on file.         Social History:     Social History     Socioeconomic History     Marital status:      Spouse name: Not on file     Number of children: Not on file     Years of education: Not on file     Highest education level: Not on file   Occupational History     Not on file   Tobacco Use     Smoking status: Not on file     Smokeless tobacco: Not on file   Substance and Sexual Activity     Alcohol use: Not on file     Drug use: Not on file     Sexual activity: Not on file   Other Topics Concern     Not on file   Social History Narrative     Not on file     Social Determinants of Health     Financial Resource Strain: Not on file   Food Insecurity: Not on file   Transportation Needs: Not on file   Physical Activity: Not on file   Stress: Not on file   Social Connections: Not on file   Intimate Partner Violence: Not on file   Housing Stability: Not on file            Family History:     No family history on file.         Medications:     Current Outpatient Medications "   Medication Sig     aspirin (ASA) 325 MG tablet Take 325 mg by mouth     busPIRone (BUSPAR) 15 MG tablet Take 15 mg by mouth     cetirizine (ZYRTEC) 10 MG tablet Take 10 mg by mouth 4 times daily     cholecalciferol 50 MCG (2000 UT) CAPS Take 2,000 Units by mouth     hydrOXYzine (VISTARIL) 25 MG capsule TAKE 1 CAPSULE BY MOUTH THREE TIMES DAILY AS NEEDED FOR MUSCLE CRAMPS     losartan (COZAAR) 25 MG tablet Take 1 tablet by mouth daily     Magnesium 400 MG TABS Take 400 mg by mouth     metFORMIN (GLUCOPHAGE XR) 500 MG 24 hr tablet Take 500 mg by mouth     methocarbamol (ROBAXIN) 500 MG tablet Take 500-1,000 mg by mouth     oxyCODONE (ROXICODONE) 5 MG tablet TAKE 1 TO 2 TABLETS BY MOUTH EVERY 4 HOURS AS NEEDED FOR PAIN     POTASSIUM CHLORIDE ER PO Take 1 tablet by mouth     predniSONE (DELTASONE) 5 MG tablet Take 1 tablet by mouth daily     Probiotic Product (PROBIOTIC-10 PO) Take 1 capsule by mouth daily     senna-docusate (SENOKOT-S/PERICOLACE) 8.6-50 MG tablet Take 2 tablets by mouth     spironolactone (ALDACTONE) 25 MG tablet TAKE 2 TABLETS BY MOUTH ONE TIME A DAY     torsemide (DEMADEX) 20 MG tablet Take 40 mg by mouth     linaclotide (LINZESS) 290 MCG capsule Take 290 mcg by mouth (Patient not taking: Reported on 11/7/2022)     traZODone (DESYREL) 50 MG tablet Take 1 tablet by mouth At Bedtime (Patient not taking: Reported on 11/7/2022)     No current facility-administered medications for this visit.              Review of Systems:   A comprehensive 10 point review of systems (constitutional, ENT, cardiac, peripheral vascular, lymphatic, respiratory, GI, , Musculoskeletal, skin, Neurological) was performed and found to be negative except as described in this note.       HOOS Hip Dysfunction & Osteoarthritis Outcome Questionnaire    No flowsheet data found.           [unfilled]    KOOS Knee Survey Assessment    No flowsheet data found.           Promis 10 Assessment    No flowsheet data found.           Ortho  Oxford Knee Questionnaire    No flowsheet data found.             See intake form completed by patient        Again, thank you for allowing me to participate in the care of your patient.        Sincerely,        Axel Honeycutt MD

## 2022-11-07 NOTE — PROGRESS NOTES
"    [facility]  Orthopaedic Surgery, Joint Replacement Consultation  by Axel Honeycutt M.D.    Nayely Wesley MRN# [id number]    YOB: 1970     Requesting physician: Royce Joyner  No primary care provider on file.            Assessment and Plan:   Assessment:  ***     Plan:  ***                 For additional information and frequently asked questions regarding joint replacements, scan the QR code image below on your phone camera:     or go to:   [email or web address]                  History of Present Illness:   52 year old female  chief complaint      Current symptoms:  Problem: Left knee periprosthetic knee infection  Onset and duration: Knee swelling and burning started on 09/18/2022  Awakens from sleep due to sx's:  Yes  Precipitating Injury:  No    Other joints or sites painful:  Yes, Bilateral hips and ankles      Background history:  DX:  1. ***    TREATMENTS:  1. December 19, 2016, Bilateral TKA, (Dr. Royce Lugo), [facility] orthopedics ([facility]  2. September 19, 2022,  Left knee irrigation and debriedment, Total explant and antibiotic spacer placement, (Dr. Rodriguez) [facility]               Physical Exam:     EXAMINATION pertinent findings:   PSYCH: Pleasant, healthy-appearing, alert, oriented x3, cooperative. Normal mood and affect.  VITAL SIGNS: Height 1.676 m (5' 6\"), weight 122.5 kg (270 lb)..  Reviewed nursing intake notes.   Body mass index is 43.58 kg/m .  RESP: non labored breathing   ABD: benign, soft, non-tender, no acute peritoneal findings  SKIN: grossly normal   LYMPHATIC: grossly normal, no adenopathy, no extremity edema  NEURO: grossly normal , no motor deficits  VASCULAR: satisfactory perfusion of all extremities   MUSCULOSKELETAL:   ***                  Data:   All laboratory data reviewed  All imaging studies reviewed by me               DATA for DOCUMENTATION:         Past Medical History: "   There is no problem list on file for this patient.    No past medical history on file.    Also see scanned health assessment forms.       Past Surgical History:   No past surgical history on file.         Social History:     Social History     Socioeconomic History     Marital status:      Spouse name: Not on file     Number of children: Not on file     Years of education: Not on file     Highest education level: Not on file   Occupational History     Not on file   Tobacco Use     Smoking status: Not on file     Smokeless tobacco: Not on file   Substance and Sexual Activity     Alcohol use: Not on file     Drug use: Not on file     Sexual activity: Not on file   Other Topics Concern     Not on file   Social History Narrative     Not on file     Social Determinants of Health     Financial Resource Strain: Not on file   Food Insecurity: Not on file   Transportation Needs: Not on file   Physical Activity: Not on file   Stress: Not on file   Social Connections: Not on file   Intimate Partner Violence: Not on file   Housing Stability: Not on file            Family History:     No family history on file.         Medications:     Current Outpatient Medications   Medication Sig     aspirin (ASA) 325 MG tablet Take 325 mg by mouth     busPIRone (BUSPAR) 15 MG tablet Take 15 mg by mouth     cetirizine (ZYRTEC) 10 MG tablet Take 10 mg by mouth 4 times daily     cholecalciferol 50 MCG (2000 UT) CAPS Take 2,000 Units by mouth     hydrOXYzine (VISTARIL) 25 MG capsule TAKE 1 CAPSULE BY MOUTH THREE TIMES DAILY AS NEEDED FOR MUSCLE CRAMPS     losartan (COZAAR) 25 MG tablet Take 1 tablet by mouth daily     Magnesium 400 MG TABS Take 400 mg by mouth     metFORMIN (GLUCOPHAGE XR) 500 MG 24 hr tablet Take 500 mg by mouth     methocarbamol (ROBAXIN) 500 MG tablet Take 500-1,000 mg by mouth     oxyCODONE (ROXICODONE) 5 MG tablet TAKE 1 TO 2 TABLETS BY MOUTH EVERY 4 HOURS AS NEEDED FOR PAIN     POTASSIUM CHLORIDE ER PO Take 1  tablet by mouth     predniSONE (DELTASONE) 5 MG tablet Take 1 tablet by mouth daily     Probiotic Product (PROBIOTIC-10 PO) Take 1 capsule by mouth daily     senna-docusate (SENOKOT-S/PERICOLACE) 8.6-50 MG tablet Take 2 tablets by mouth     spironolactone (ALDACTONE) 25 MG tablet TAKE 2 TABLETS BY MOUTH ONE TIME A DAY     torsemide (DEMADEX) 20 MG tablet Take 40 mg by mouth     linaclotide (LINZESS) 290 MCG capsule Take 290 mcg by mouth (Patient not taking: Reported on 11/7/2022)     traZODone (DESYREL) 50 MG tablet Take 1 tablet by mouth At Bedtime (Patient not taking: Reported on 11/7/2022)     No current facility-administered medications for this visit.              Review of Systems:   A comprehensive 10 point review of systems (constitutional, ENT, cardiac, peripheral vascular, lymphatic, respiratory, GI, , Musculoskeletal, skin, Neurological) was performed and found to be negative except as described in this note.       HOOS Hip Dysfunction & Osteoarthritis Outcome Questionnaire    No flowsheet data found.           [unfilled]    KOOS Knee Survey Assessment    No flowsheet data found.           Promis 10 Assessment    No flowsheet data found.           Ortho Oxford Knee Questionnaire    No flowsheet data found.             See intake form completed by patient

## 2022-11-07 NOTE — LETTER
11/7/2022         RE: Nayely Wesley  [address] [address] [address]        Dear Colleague,    Thank you for referring your patient, Nayely Wesley, to the [facility] [place]. Please see a copy of my visit note below.        [facility]  Orthopaedic Surgery, Joint Replacement Consultation  by Axel Honeycutt M.D.    Nayely Wesley MRN# [id number]    YOB: 1970     Requesting physician: Ramu Lugo  No primary care provider on file.            Assessment and Plan:   Assessment:  52-year-old female with left knee status post explant and insertion of spacer for either culture negative PJI or implant debris related inflammatory arthropathy.     Plan:  1. Toe-touch weightbearing left lower extremity.  Range of motion as tolerated  2. She may discontinue the knee immobilizer.  3. Counseled the patient on the timeline of optimal treatment  4. We would like her to be on 8 weeks of antibiotic holiday  5. The patient has a dental appointment on 11/20.  We recommend antibiotics for that dental appointment.  We also counseled the patient that if she takes antibiotics for that dental appointment, the 8-week antibiotic holiday will restart on that day.  6. After the completion of antibiotic holiday, we will aspirate the knee.  If the aspiration is negative for infection, then we will proceed to the second stage of reimplantation.  The patient will call us to make a follow-up appointment after the antibiotic holiday.  7. We will get XR of L knee at the follow-up visit  8. The patient is also on prednisone.  Her rheumatologist has not figured out what condition she has.  We recommend tapering off of the prednisone unless there is a good reason to be on it, given her PJI history      Fabiano Ortiz MD  Adult Reconstructive Surgery Fellow  Department of Orthopaedic Surgery, [facility]    Attending MD (Dr. Axel Honeycutt) Attestation :  This patient was  "seen and evaluated by me including a history, exam, and interpretation of all imaging and/or lab data.  I formulated the treatment plan along with either a physician's assistant (MAME) or training physician (resident/fellow), who also saw the patient. That individual or a scribe has documented the visit in the attached note which I approve.    MD Rg Crockett Family Professor  Oncology and Adult Reconstructive Surgery  Dept Orthopaedic Surgery, [facility]  [phone] office, [phone] pager  [email or web address]                   History of Present Illness:   52 year old female presents to the office today with a left knee PJI.  She will originally had bilateral TKA on 12/19/2016.  On 9/18/2022, she presented to the ER with concerns for left knee PJI.  Her knee was aspirated and she underwent surgery.  I&D and polyexchange was planned, but it was determined that her components were loose and an explant and insertion of antibiotic spacer was performed.  The patient experienced no complications.  No organisms grew from the cultures.  The patient had 6 weeks of vancomycin and ceftriaxone.  It has been 1 week since she was last on antibiotics.    Currently, the patient is in a knee immobilizer.  She is anxious and hopeful for the next steps.  She currently does not have any fevers or chills.  Denies any numbness or tingling.  Denies any other concerns.      Background history:  DX:  1. Bilateral knee osteoarthritis  2. Left knee PJI    TREATMENTS:  1. 12/19/2016, bilateral TKA ([facility]), EH  2. 9/18/2022, L knee aspiration (Lesmeister), EH: 53k wbc, 90% neutrophils, no growth  3. 9/19/2022: L TKA explant, insertion of spacer (Lesmeister), EH           Physical Exam:     EXAMINATION pertinent findings:   PSYCH: Pleasant, healthy-appearing, alert, oriented x3, cooperative. Normal mood and affect.  VITAL SIGNS: Height 1.676 m (5' 6\"), weight 122.5 kg (270 lb)..  Reviewed nursing intake notes.   Body mass " index is 43.58 kg/m .  RESP: non labored breathing   ABD: benign, soft, non-tender, no acute peritoneal findings  SKIN: grossly normal   LYMPHATIC: grossly normal, no adenopathy, no extremity edema  NEURO: grossly normal , no motor deficits  VASCULAR: satisfactory perfusion of all extremities   MUSCULOSKELETAL:   LLE:  In knee immobilizer  Skin intact  Incision well-healed  No ecchymosis or abrasions  No erythema, warmth, or effusion  No TTP  Fires TA/GS/P/FHL/EHL  SILT SP/DP/Saph/Sural/T  WWP, 2+ DP pulse               Data:   All laboratory data reviewed  All imaging studies reviewed by me       X-rays of the left knee from 10/31/2022 reviewed in the office showing left knee with antibiotic spacer in good position.        DATA for DOCUMENTATION:         Past Medical History:   There is no problem list on file for this patient.    No past medical history on file.    Also see scanned health assessment forms.       Past Surgical History:   No past surgical history on file.         Social History:     Social History     Socioeconomic History     Marital status:      Spouse name: Not on file     Number of children: Not on file     Years of education: Not on file     Highest education level: Not on file   Occupational History     Not on file   Tobacco Use     Smoking status: Not on file     Smokeless tobacco: Not on file   Substance and Sexual Activity     Alcohol use: Not on file     Drug use: Not on file     Sexual activity: Not on file   Other Topics Concern     Not on file   Social History Narrative     Not on file     Social Determinants of Health     Financial Resource Strain: Not on file   Food Insecurity: Not on file   Transportation Needs: Not on file   Physical Activity: Not on file   Stress: Not on file   Social Connections: Not on file   Intimate Partner Violence: Not on file   Housing Stability: Not on file            Family History:     No family history on file.         Medications:     Current  Outpatient Medications   Medication Sig     aspirin (ASA) 325 MG tablet Take 325 mg by mouth     busPIRone (BUSPAR) 15 MG tablet Take 15 mg by mouth     cetirizine (ZYRTEC) 10 MG tablet Take 10 mg by mouth 4 times daily     cholecalciferol 50 MCG (2000 UT) CAPS Take 2,000 Units by mouth     hydrOXYzine (VISTARIL) 25 MG capsule TAKE 1 CAPSULE BY MOUTH THREE TIMES DAILY AS NEEDED FOR MUSCLE CRAMPS     losartan (COZAAR) 25 MG tablet Take 1 tablet by mouth daily     Magnesium 400 MG TABS Take 400 mg by mouth     metFORMIN (GLUCOPHAGE XR) 500 MG 24 hr tablet Take 500 mg by mouth     methocarbamol (ROBAXIN) 500 MG tablet Take 500-1,000 mg by mouth     oxyCODONE (ROXICODONE) 5 MG tablet TAKE 1 TO 2 TABLETS BY MOUTH EVERY 4 HOURS AS NEEDED FOR PAIN     POTASSIUM CHLORIDE ER PO Take 1 tablet by mouth     predniSONE (DELTASONE) 5 MG tablet Take 1 tablet by mouth daily     Probiotic Product (PROBIOTIC-10 PO) Take 1 capsule by mouth daily     senna-docusate (SENOKOT-S/PERICOLACE) 8.6-50 MG tablet Take 2 tablets by mouth     spironolactone (ALDACTONE) 25 MG tablet TAKE 2 TABLETS BY MOUTH ONE TIME A DAY     torsemide (DEMADEX) 20 MG tablet Take 40 mg by mouth     linaclotide (LINZESS) 290 MCG capsule Take 290 mcg by mouth (Patient not taking: Reported on 11/7/2022)     traZODone (DESYREL) 50 MG tablet Take 1 tablet by mouth At Bedtime (Patient not taking: Reported on 11/7/2022)     No current facility-administered medications for this visit.              Review of Systems:   A comprehensive 10 point review of systems (constitutional, ENT, cardiac, peripheral vascular, lymphatic, respiratory, GI, , Musculoskeletal, skin, Neurological) was performed and found to be negative except as described in this note.       HOOS Hip Dysfunction & Osteoarthritis Outcome Questionnaire    No flowsheet data found.           [unfilled]    KOOS Knee Survey Assessment    No flowsheet data found.           Promis 10 Assessment    No flowsheet data  found.           Ortho Oxford Knee Questionnaire    No flowsheet data found.             See intake form completed by patient        Again, thank you for allowing me to participate in the care of your patient.        Sincerely,        Axel Honeycutt MD

## 2022-11-07 NOTE — LETTER
11/7/2022         RE: Nayely Wesley  [address] [address] [address]        Dear Colleague,    Thank you for referring your patient, Nayely Wesley, to the [facility] [place]. Please see a copy of my visit note below.        [facility]  Orthopaedic Surgery, Joint Replacement Consultation  by Axel Honeycutt M.D.    Nayely Wesley MRN# [id number]    YOB: 1970     Requesting physician: Ramu Lugo  No primary care provider on file.            Assessment and Plan:   Assessment:  52-year-old female with left knee status post explant and insertion of spacer for either culture negative PJI or implant debris related inflammatory arthropathy.     Plan:  1. Toe-touch weightbearing left lower extremity.  Range of motion as tolerated  2. She may discontinue the knee immobilizer.  3. Counseled the patient on the timeline of optimal treatment  4. We would like her to be on 8 weeks of antibiotic holiday  5. The patient has a dental appointment on 11/20.  We recommend antibiotics for that dental appointment.  We also counseled the patient that if she takes antibiotics for that dental appointment, the 8-week antibiotic holiday will restart on that day.  6. After the completion of antibiotic holiday, we will aspirate the knee.  If the aspiration is negative for infection, then we will proceed to the second stage of reimplantation.  The patient will call us to make a follow-up appointment after the antibiotic holiday.  7. We will get XR of L knee at the follow-up visit  8. The patient is also on prednisone.  Her rheumatologist has not figured out what condition she has.  We recommend tapering off of the prednisone unless there is a good reason to be on it, given her PJI history      Fabiano Ortiz MD  Adult Reconstructive Surgery Fellow  Department of Orthopaedic Surgery, [facility]    Attending MD (Dr. Axel Honeycutt) Attestation :  This patient was  "seen and evaluated by me including a history, exam, and interpretation of all imaging and/or lab data.  I formulated the treatment plan along with either a physician's assistant (MAME) or training physician (resident/fellow), who also saw the patient. That individual or a scribe has documented the visit in the attached note which I approve.    MD Rg Crockett Family Professor  Oncology and Adult Reconstructive Surgery  Dept Orthopaedic Surgery, [facility]  [phone] office, [phone] pager  [email or web address]                   History of Present Illness:   52 year old female presents to the office today with a left knee PJI.  She will originally had bilateral TKA on 12/19/2016.  On 9/18/2022, she presented to the ER with concerns for left knee PJI.  Her knee was aspirated and she underwent surgery.  I&D and polyexchange was planned, but it was determined that her components were loose and an explant and insertion of antibiotic spacer was performed.  The patient experienced no complications.  No organisms grew from the cultures.  The patient had 6 weeks of vancomycin and ceftriaxone.  It has been 1 week since she was last on antibiotics.    Currently, the patient is in a knee immobilizer.  She is anxious and hopeful for the next steps.  She currently does not have any fevers or chills.  Denies any numbness or tingling.  Denies any other concerns.      Background history:  DX:  1. Bilateral knee osteoarthritis  2. Left knee PJI    TREATMENTS:  1. 12/19/2016, bilateral TKA ([facility]), EH  2. 9/18/2022, L knee aspiration (Lesmeister), EH: 53k wbc, 90% neutrophils, no growth  3. 9/19/2022: L TKA explant, insertion of spacer (Lesmeister), EH           Physical Exam:     EXAMINATION pertinent findings:   PSYCH: Pleasant, healthy-appearing, alert, oriented x3, cooperative. Normal mood and affect.  VITAL SIGNS: Height 1.676 m (5' 6\"), weight 122.5 kg (270 lb)..  Reviewed nursing intake notes.   Body mass " index is 43.58 kg/m .  RESP: non labored breathing   ABD: benign, soft, non-tender, no acute peritoneal findings  SKIN: grossly normal   LYMPHATIC: grossly normal, no adenopathy, no extremity edema  NEURO: grossly normal , no motor deficits  VASCULAR: satisfactory perfusion of all extremities   MUSCULOSKELETAL:   LLE:  In knee immobilizer  Skin intact  Incision well-healed  No ecchymosis or abrasions  No erythema, warmth, or effusion  No TTP  Fires TA/GS/P/FHL/EHL  SILT SP/DP/Saph/Sural/T  WWP, 2+ DP pulse               Data:   All laboratory data reviewed  All imaging studies reviewed by me       X-rays of the left knee from 10/31/2022 reviewed in the office showing left knee with antibiotic spacer in good position.        DATA for DOCUMENTATION:         Past Medical History:   There is no problem list on file for this patient.    No past medical history on file.    Also see scanned health assessment forms.       Past Surgical History:   No past surgical history on file.         Social History:     Social History     Socioeconomic History     Marital status:      Spouse name: Not on file     Number of children: Not on file     Years of education: Not on file     Highest education level: Not on file   Occupational History     Not on file   Tobacco Use     Smoking status: Not on file     Smokeless tobacco: Not on file   Substance and Sexual Activity     Alcohol use: Not on file     Drug use: Not on file     Sexual activity: Not on file   Other Topics Concern     Not on file   Social History Narrative     Not on file     Social Determinants of Health     Financial Resource Strain: Not on file   Food Insecurity: Not on file   Transportation Needs: Not on file   Physical Activity: Not on file   Stress: Not on file   Social Connections: Not on file   Intimate Partner Violence: Not on file   Housing Stability: Not on file            Family History:     No family history on file.         Medications:     Current  Outpatient Medications   Medication Sig     aspirin (ASA) 325 MG tablet Take 325 mg by mouth     busPIRone (BUSPAR) 15 MG tablet Take 15 mg by mouth     cetirizine (ZYRTEC) 10 MG tablet Take 10 mg by mouth 4 times daily     cholecalciferol 50 MCG (2000 UT) CAPS Take 2,000 Units by mouth     hydrOXYzine (VISTARIL) 25 MG capsule TAKE 1 CAPSULE BY MOUTH THREE TIMES DAILY AS NEEDED FOR MUSCLE CRAMPS     losartan (COZAAR) 25 MG tablet Take 1 tablet by mouth daily     Magnesium 400 MG TABS Take 400 mg by mouth     metFORMIN (GLUCOPHAGE XR) 500 MG 24 hr tablet Take 500 mg by mouth     methocarbamol (ROBAXIN) 500 MG tablet Take 500-1,000 mg by mouth     oxyCODONE (ROXICODONE) 5 MG tablet TAKE 1 TO 2 TABLETS BY MOUTH EVERY 4 HOURS AS NEEDED FOR PAIN     POTASSIUM CHLORIDE ER PO Take 1 tablet by mouth     predniSONE (DELTASONE) 5 MG tablet Take 1 tablet by mouth daily     Probiotic Product (PROBIOTIC-10 PO) Take 1 capsule by mouth daily     senna-docusate (SENOKOT-S/PERICOLACE) 8.6-50 MG tablet Take 2 tablets by mouth     spironolactone (ALDACTONE) 25 MG tablet TAKE 2 TABLETS BY MOUTH ONE TIME A DAY     torsemide (DEMADEX) 20 MG tablet Take 40 mg by mouth     linaclotide (LINZESS) 290 MCG capsule Take 290 mcg by mouth (Patient not taking: Reported on 11/7/2022)     traZODone (DESYREL) 50 MG tablet Take 1 tablet by mouth At Bedtime (Patient not taking: Reported on 11/7/2022)     No current facility-administered medications for this visit.              Review of Systems:   A comprehensive 10 point review of systems (constitutional, ENT, cardiac, peripheral vascular, lymphatic, respiratory, GI, , Musculoskeletal, skin, Neurological) was performed and found to be negative except as described in this note.       HOOS Hip Dysfunction & Osteoarthritis Outcome Questionnaire    No flowsheet data found.           [unfilled]    KOOS Knee Survey Assessment    No flowsheet data found.           Promis 10 Assessment    No flowsheet data  found.           Ortho Oxford Knee Questionnaire    No flowsheet data found.             See intake form completed by patient        Again, thank you for allowing me to participate in the care of your patient.        Sincerely,        Axel Honeycutt MD

## 2022-11-07 NOTE — PROGRESS NOTES
[facility]  Orthopaedic Surgery, Joint Replacement Consultation  by Axel Honeycutt M.D.    Nayely Wesley MRN# [id number]    YOB: 1970     Requesting physician: Ramu Lugo  No primary care provider on file.            Assessment and Plan:   Assessment:  52-year-old female with left knee status post explant and insertion of spacer for either culture negative PJI or implant debris related inflammatory arthropathy.     Plan:  1. Toe-touch weightbearing left lower extremity.  Range of motion as tolerated  2. She may discontinue the knee immobilizer.  3. Counseled the patient on the timeline of optimal treatment  4. We would like her to be on 8 weeks of antibiotic holiday  5. The patient has a dental appointment on 11/20.  We recommend antibiotics for that dental appointment.  We also counseled the patient that if she takes antibiotics for that dental appointment, the 8-week antibiotic holiday will restart on that day.  6. After the completion of antibiotic holiday, we will aspirate the knee.  If the aspiration is negative for infection, then we will proceed to the second stage of reimplantation.  The patient will call us to make a follow-up appointment after the antibiotic holiday.  7. We will get XR of L knee at the follow-up visit  8. The patient is also on prednisone.  Her rheumatologist has not figured out what condition she has.  We recommend tapering off of the prednisone unless there is a good reason to be on it, given her PJI history      Fabiano Ortiz MD  Adult Reconstructive Surgery Fellow  Department of Orthopaedic Surgery, [facility]    Attending MD (Dr. Axel Honeycutt) Attestation :  This patient was seen and evaluated by me including a history, exam, and interpretation of all imaging and/or lab data.  I formulated the treatment plan along with either a physician's assistant (MAME) or training physician (resident/fellow), who also saw the patient. That  "individual or a scribe has documented the visit in the attached note which I approve.    Axel Honeycutt MD  [facility] Family Professor  Oncology and Adult Reconstructive Surgery  Dept Orthopaedic Surgery, [facility]  [phone] office, [phone] pager  [email or web address]                   History of Present Illness:   52 year old female presents to the office today with a left knee PJI.  She will originally had bilateral TKA on 12/19/2016.  On 9/18/2022, she presented to the ER with concerns for left knee PJI.  Her knee was aspirated and she underwent surgery.  I&D and polyexchange was planned, but it was determined that her components were loose and an explant and insertion of antibiotic spacer was performed.  The patient experienced no complications.  No organisms grew from the cultures.  The patient had 6 weeks of vancomycin and ceftriaxone.  It has been 1 week since she was last on antibiotics.    Currently, the patient is in a knee immobilizer.  She is anxious and hopeful for the next steps.  She currently does not have any fevers or chills.  Denies any numbness or tingling.  Denies any other concerns.      Background history:  DX:  1. Bilateral knee osteoarthritis  2. Left knee PJI    TREATMENTS:  1. 12/19/2016, bilateral TKA (Parish), EH  2. 9/18/2022, L knee aspiration (Lesmeister), EH: 53k wbc, 90% neutrophils, no growth  3. 9/19/2022: L TKA explant, insertion of spacer (Lesmeister), EH           Physical Exam:     EXAMINATION pertinent findings:   PSYCH: Pleasant, healthy-appearing, alert, oriented x3, cooperative. Normal mood and affect.  VITAL SIGNS: Height 1.676 m (5' 6\"), weight 122.5 kg (270 lb)..  Reviewed nursing intake notes.   Body mass index is 43.58 kg/m .  RESP: non labored breathing   ABD: benign, soft, non-tender, no acute peritoneal findings  SKIN: grossly normal   LYMPHATIC: grossly normal, no adenopathy, no extremity edema  NEURO: grossly normal , no motor deficits  VASCULAR: " satisfactory perfusion of all extremities   MUSCULOSKELETAL:   LLE:  In knee immobilizer  Skin intact  Incision well-healed  No ecchymosis or abrasions  No erythema, warmth, or effusion  No TTP  Fires TA/GS/P/FHL/EHL  SILT SP/DP/Saph/Sural/T  WWP, 2+ DP pulse               Data:   All laboratory data reviewed  All imaging studies reviewed by me       X-rays of the left knee from 10/31/2022 reviewed in the office showing left knee with antibiotic spacer in good position.        DATA for DOCUMENTATION:         Past Medical History:   There is no problem list on file for this patient.    No past medical history on file.    Also see scanned health assessment forms.       Past Surgical History:   No past surgical history on file.         Social History:     Social History     Socioeconomic History     Marital status:      Spouse name: Not on file     Number of children: Not on file     Years of education: Not on file     Highest education level: Not on file   Occupational History     Not on file   Tobacco Use     Smoking status: Not on file     Smokeless tobacco: Not on file   Substance and Sexual Activity     Alcohol use: Not on file     Drug use: Not on file     Sexual activity: Not on file   Other Topics Concern     Not on file   Social History Narrative     Not on file     Social Determinants of Health     Financial Resource Strain: Not on file   Food Insecurity: Not on file   Transportation Needs: Not on file   Physical Activity: Not on file   Stress: Not on file   Social Connections: Not on file   Intimate Partner Violence: Not on file   Housing Stability: Not on file            Family History:     No family history on file.         Medications:     Current Outpatient Medications   Medication Sig     aspirin (ASA) 325 MG tablet Take 325 mg by mouth     busPIRone (BUSPAR) 15 MG tablet Take 15 mg by mouth     cetirizine (ZYRTEC) 10 MG tablet Take 10 mg by mouth 4 times daily     cholecalciferol 50 MCG (2000  UT) CAPS Take 2,000 Units by mouth     hydrOXYzine (VISTARIL) 25 MG capsule TAKE 1 CAPSULE BY MOUTH THREE TIMES DAILY AS NEEDED FOR MUSCLE CRAMPS     losartan (COZAAR) 25 MG tablet Take 1 tablet by mouth daily     Magnesium 400 MG TABS Take 400 mg by mouth     metFORMIN (GLUCOPHAGE XR) 500 MG 24 hr tablet Take 500 mg by mouth     methocarbamol (ROBAXIN) 500 MG tablet Take 500-1,000 mg by mouth     oxyCODONE (ROXICODONE) 5 MG tablet TAKE 1 TO 2 TABLETS BY MOUTH EVERY 4 HOURS AS NEEDED FOR PAIN     POTASSIUM CHLORIDE ER PO Take 1 tablet by mouth     predniSONE (DELTASONE) 5 MG tablet Take 1 tablet by mouth daily     Probiotic Product (PROBIOTIC-10 PO) Take 1 capsule by mouth daily     senna-docusate (SENOKOT-S/PERICOLACE) 8.6-50 MG tablet Take 2 tablets by mouth     spironolactone (ALDACTONE) 25 MG tablet TAKE 2 TABLETS BY MOUTH ONE TIME A DAY     torsemide (DEMADEX) 20 MG tablet Take 40 mg by mouth     linaclotide (LINZESS) 290 MCG capsule Take 290 mcg by mouth (Patient not taking: Reported on 11/7/2022)     traZODone (DESYREL) 50 MG tablet Take 1 tablet by mouth At Bedtime (Patient not taking: Reported on 11/7/2022)     No current facility-administered medications for this visit.              Review of Systems:   A comprehensive 10 point review of systems (constitutional, ENT, cardiac, peripheral vascular, lymphatic, respiratory, GI, , Musculoskeletal, skin, Neurological) was performed and found to be negative except as described in this note.       HOOS Hip Dysfunction & Osteoarthritis Outcome Questionnaire    No flowsheet data found.           [unfilled]    KOOS Knee Survey Assessment    No flowsheet data found.           Promis 10 Assessment    No flowsheet data found.           Ortho Oxford Knee Questionnaire    No flowsheet data found.             See intake form completed by patient

## 2022-11-07 NOTE — NURSING NOTE
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 Referred by Dr Esteban. Pt had bilateral TKA in 2016. Pt states that about a year after surgery their knees were almost recovered, but both legs burned at night. 09/18/2022 Pt woke up from sleep with severe pain and an intense burning sensation in the left knee. In the morning, the patient's knee was swollen. She was admitted to the hospital with a high WBC count and Dr. Esteban removed the prosthetic and put in an antibiotic spacer.       52 year old  1970    Ht 1.676 m (5' 6\")   Wt 122.5 kg (270 lb)   BMI 43.58 kg/m      No past surgical history on file.        Pain Assessment  Patient Currently in Pain: Yes  0-10 Pain Scale: 4  Primary Pain Location: Knee (Left)               [id number] - [place], [place] - [address] AT NEC [place] [address] [place] [address]        Allergies   Allergen Reactions     Bupropion Hives     Hydrocodone-Acetaminophen Anaphylaxis     Pt had taken Vicodin, norflex and medrol all at same time  Pt had taken Vicodin, norflex and medrol all at same time  Pt had taken Vicodin, norflex and medrol all at same time       Gadolinium Derivatives Swelling     Cat Hair Extract Other (See Comments)     Difficult breathing     No Clinical Screening - See Comments      2009 - had Medrol pack, vicodin and norflex  together for pain med treatment - had tongue swell,  face swell and SOB symptoms - has taken each separate without problem. Do not use together.      Adhesive Tape Rash     Band aids           Current Outpatient Medications   Medication     aspirin (ASA) 325 MG tablet     busPIRone (BUSPAR) 15 MG tablet     cetirizine (ZYRTEC) 10 MG tablet     cholecalciferol 50 MCG (2000 UT) CAPS     hydrOXYzine (VISTARIL) 25 MG capsule     losartan (COZAAR) 25 MG tablet     Magnesium 400 MG TABS     metFORMIN (GLUCOPHAGE XR) 500 MG 24 hr tablet     methocarbamol " (ROBAXIN) 500 MG tablet     oxyCODONE (ROXICODONE) 5 MG tablet     POTASSIUM CHLORIDE ER PO     predniSONE (DELTASONE) 5 MG tablet     Probiotic Product (PROBIOTIC-10 PO)     senna-docusate (SENOKOT-S/PERICOLACE) 8.6-50 MG tablet     spironolactone (ALDACTONE) 25 MG tablet     torsemide (DEMADEX) 20 MG tablet     linaclotide (LINZESS) 290 MCG capsule     traZODone (DESYREL) 50 MG tablet     No current facility-administered medications for this visit.             Questionnaires:

## 2022-11-07 NOTE — LETTER
Date:November 8, 2022      Patient was self referred, no letter generated. Do not send.        [facility] Health Information

## 2022-11-07 NOTE — LETTER
11/7/2022         RE: Nayely Wesley  [address] [address] [address]        Dear Colleague,    Thank you for referring your patient, Nayely Wesley, to the [facility] [place]. Please see a copy of my visit note below.        [facility]  Orthopaedic Surgery, Joint Replacement Consultation  by Axel Honeycutt M.D.    Nayely Wesley MRN# [id number]    YOB: 1970     Requesting physician: Ramu Esteban  No primary care provider on file.            Assessment and Plan:   Assessment:  52-year-old female with left knee PJI status post explant and insertion of spacer.  She is currently on week 1 of antibiotic holiday     Plan:  1. Toe-touch weightbearing left lower extremity  2. Counseled the patient on the timeline of optimal treatment  3. We would like her to be on 8 weeks of antibiotic holiday  4. After the completion of antibiotic holiday, we will aspirate the knee.  If the aspiration is negative for infection, then we will proceed to the second stage of reimplantation.     Fabiano Ortiz MD  Adult Reconstructive Surgery Fellow  Department of Orthopaedic Surgery, [facility]               History of Present Illness:   52 year old female presents to the office today with a left knee PJI.  She will originally had bilateral TKA on 12/19/2016.  On 9/18/2022, she presented to the ER with concerns for left knee PJI.  Her knee was aspirated and she underwent surgery.  I&D and polyexchange was planned, but it was determined that her components were loose and an explant and insertion of antibiotic spacer was performed.  The patient experienced no complications.  No organisms grew from the cultures.  The patient had 6 weeks of vancomycin and ceftriaxone.  It has been 1 week since she was last on antibiotics.    Currently, the patient is in a knee immobilizer.  She is anxious and hopeful for the next steps.  She currently does not have any fevers or chills.   "Denies any numbness or tingling.  Denies any other concerns.      Background history:  DX:  1. Bilateral knee osteoarthritis  2. Left knee PJI    TREATMENTS:  1. 12/19/2016, bilateral TKA ([facility]ish), EH  2. 9/18/2022, L knee aspiration (Lesmeister), EH: 53k wbc, 90% neutrophils, no growth  3. 9/19/2022: L TKA explant, insertion of spacer (Lesmeister), EH           Physical Exam:     EXAMINATION pertinent findings:   PSYCH: Pleasant, healthy-appearing, alert, oriented x3, cooperative. Normal mood and affect.  VITAL SIGNS: Height 1.676 m (5' 6\"), weight 122.5 kg (270 lb)..  Reviewed nursing intake notes.   Body mass index is 43.58 kg/m .  RESP: non labored breathing   ABD: benign, soft, non-tender, no acute peritoneal findings  SKIN: grossly normal   LYMPHATIC: grossly normal, no adenopathy, no extremity edema  NEURO: grossly normal , no motor deficits  VASCULAR: satisfactory perfusion of all extremities   MUSCULOSKELETAL:   LLE:  In knee immobilizer  Skin intact  Incision well-healed  No ecchymosis or abrasions  No erythema, warmth, or effusion  No TTP  Fires TA/GS/P/FHL/EHL  SILT SP/DP/Saph/Sural/T  WWP, 2+ DP pulse               Data:   All laboratory data reviewed  All imaging studies reviewed by me       X-rays of the left knee from 10/31/2022 reviewed in the office showing left knee with antibiotic spacer in good position.        DATA for DOCUMENTATION:         Past Medical History:   There is no problem list on file for this patient.    No past medical history on file.    Also see scanned health assessment forms.       Past Surgical History:   No past surgical history on file.         Social History:     Social History     Socioeconomic History     Marital status:      Spouse name: Not on file     Number of children: Not on file     Years of education: Not on file     Highest education level: Not on file   Occupational History     Not on file   Tobacco Use     Smoking status: Not on file     Smokeless " tobacco: Not on file   Substance and Sexual Activity     Alcohol use: Not on file     Drug use: Not on file     Sexual activity: Not on file   Other Topics Concern     Not on file   Social History Narrative     Not on file     Social Determinants of Health     Financial Resource Strain: Not on file   Food Insecurity: Not on file   Transportation Needs: Not on file   Physical Activity: Not on file   Stress: Not on file   Social Connections: Not on file   Intimate Partner Violence: Not on file   Housing Stability: Not on file            Family History:     No family history on file.         Medications:     Current Outpatient Medications   Medication Sig     aspirin (ASA) 325 MG tablet Take 325 mg by mouth     busPIRone (BUSPAR) 15 MG tablet Take 15 mg by mouth     cetirizine (ZYRTEC) 10 MG tablet Take 10 mg by mouth 4 times daily     cholecalciferol 50 MCG (2000 UT) CAPS Take 2,000 Units by mouth     hydrOXYzine (VISTARIL) 25 MG capsule TAKE 1 CAPSULE BY MOUTH THREE TIMES DAILY AS NEEDED FOR MUSCLE CRAMPS     losartan (COZAAR) 25 MG tablet Take 1 tablet by mouth daily     Magnesium 400 MG TABS Take 400 mg by mouth     metFORMIN (GLUCOPHAGE XR) 500 MG 24 hr tablet Take 500 mg by mouth     methocarbamol (ROBAXIN) 500 MG tablet Take 500-1,000 mg by mouth     oxyCODONE (ROXICODONE) 5 MG tablet TAKE 1 TO 2 TABLETS BY MOUTH EVERY 4 HOURS AS NEEDED FOR PAIN     POTASSIUM CHLORIDE ER PO Take 1 tablet by mouth     predniSONE (DELTASONE) 5 MG tablet Take 1 tablet by mouth daily     Probiotic Product (PROBIOTIC-10 PO) Take 1 capsule by mouth daily     senna-docusate (SENOKOT-S/PERICOLACE) 8.6-50 MG tablet Take 2 tablets by mouth     spironolactone (ALDACTONE) 25 MG tablet TAKE 2 TABLETS BY MOUTH ONE TIME A DAY     torsemide (DEMADEX) 20 MG tablet Take 40 mg by mouth     linaclotide (LINZESS) 290 MCG capsule Take 290 mcg by mouth (Patient not taking: Reported on 11/7/2022)     traZODone (DESYREL) 50 MG tablet Take 1 tablet by  mouth At Bedtime (Patient not taking: Reported on 11/7/2022)     No current facility-administered medications for this visit.              Review of Systems:   A comprehensive 10 point review of systems (constitutional, ENT, cardiac, peripheral vascular, lymphatic, respiratory, GI, , Musculoskeletal, skin, Neurological) was performed and found to be negative except as described in this note.       HOOS Hip Dysfunction & Osteoarthritis Outcome Questionnaire    No flowsheet data found.           [unfilled]    KOOS Knee Survey Assessment    No flowsheet data found.           Promis 10 Assessment    No flowsheet data found.           Ortho Oxford Knee Questionnaire    No flowsheet data found.             See intake form completed by patient          [facility]  Orthopaedic Surgery, Joint Replacement Consultation  by Axel Honeycutt M.D.    Nayely Wesley MRN# [id number]    YOB: 1970     Requesting physician: Ramu Esteban  No primary care provider on file.            Assessment and Plan:   Assessment:  ***     Plan:  ***                 For additional information and frequently asked questions regarding joint replacements, scan the QR code image below on your phone camera:     or go to:   [email or web address]                  History of Present Illness:   52 year old female  chief complaint      Current symptoms:  Problem: Left knee periprosthetic knee infection  Onset and duration: Knee swelling and burning started on 09/18/2022  Awakens from sleep due to sx's:  Yes  Precipitating Injury:  No    Other joints or sites painful:  Yes, Bilateral hips and ankles      Background history:  DX:  3. ***    TREATMENTS:  4. December 19, 2016, Bilateral TKA, (Dr. Royce Lugo), [facility] orth[facility] ([facility]).  4. September 19, 2022,  Left knee irrigation and debriedment, Total explant and antibiotic spacer placement, (Dr. Rodriguez) [facility]  "Orthopedics               Physical Exam:     EXAMINATION pertinent findings:   PSYCH: Pleasant, healthy-appearing, alert, oriented x3, cooperative. Normal mood and affect.  VITAL SIGNS: Height 1.676 m (5' 6\"), weight 122.5 kg (270 lb)..  Reviewed nursing intake notes.   Body mass index is 43.58 kg/m .  RESP: non labored breathing   ABD: benign, soft, non-tender, no acute peritoneal findings  SKIN: grossly normal   LYMPHATIC: grossly normal, no adenopathy, no extremity edema  NEURO: grossly normal , no motor deficits  VASCULAR: satisfactory perfusion of all extremities   MUSCULOSKELETAL:   ***                  Data:   All laboratory data reviewed  All imaging studies reviewed by me               DATA for DOCUMENTATION:         Past Medical History:   There is no problem list on file for this patient.    No past medical history on file.    Also see scanned health assessment forms.       Past Surgical History:   No past surgical history on file.         Social History:     Social History     Socioeconomic History     Marital status:      Spouse name: Not on file     Number of children: Not on file     Years of education: Not on file     Highest education level: Not on file   Occupational History     Not on file   Tobacco Use     Smoking status: Not on file     Smokeless tobacco: Not on file   Substance and Sexual Activity     Alcohol use: Not on file     Drug use: Not on file     Sexual activity: Not on file   Other Topics Concern     Not on file   Social History Narrative     Not on file     Social Determinants of Health     Financial Resource Strain: Not on file   Food Insecurity: Not on file   Transportation Needs: Not on file   Physical Activity: Not on file   Stress: Not on file   Social Connections: Not on file   Intimate Partner Violence: Not on file   Housing Stability: Not on file            Family History:     No family history on file.         Medications:     Current Outpatient Medications "   Medication Sig     aspirin (ASA) 325 MG tablet Take 325 mg by mouth     busPIRone (BUSPAR) 15 MG tablet Take 15 mg by mouth     cetirizine (ZYRTEC) 10 MG tablet Take 10 mg by mouth 4 times daily     cholecalciferol 50 MCG (2000 UT) CAPS Take 2,000 Units by mouth     hydrOXYzine (VISTARIL) 25 MG capsule TAKE 1 CAPSULE BY MOUTH THREE TIMES DAILY AS NEEDED FOR MUSCLE CRAMPS     losartan (COZAAR) 25 MG tablet Take 1 tablet by mouth daily     Magnesium 400 MG TABS Take 400 mg by mouth     metFORMIN (GLUCOPHAGE XR) 500 MG 24 hr tablet Take 500 mg by mouth     methocarbamol (ROBAXIN) 500 MG tablet Take 500-1,000 mg by mouth     oxyCODONE (ROXICODONE) 5 MG tablet TAKE 1 TO 2 TABLETS BY MOUTH EVERY 4 HOURS AS NEEDED FOR PAIN     POTASSIUM CHLORIDE ER PO Take 1 tablet by mouth     predniSONE (DELTASONE) 5 MG tablet Take 1 tablet by mouth daily     Probiotic Product (PROBIOTIC-10 PO) Take 1 capsule by mouth daily     senna-docusate (SENOKOT-S/PERICOLACE) 8.6-50 MG tablet Take 2 tablets by mouth     spironolactone (ALDACTONE) 25 MG tablet TAKE 2 TABLETS BY MOUTH ONE TIME A DAY     torsemide (DEMADEX) 20 MG tablet Take 40 mg by mouth     linaclotide (LINZESS) 290 MCG capsule Take 290 mcg by mouth (Patient not taking: Reported on 11/7/2022)     traZODone (DESYREL) 50 MG tablet Take 1 tablet by mouth At Bedtime (Patient not taking: Reported on 11/7/2022)     No current facility-administered medications for this visit.              Review of Systems:   A comprehensive 10 point review of systems (constitutional, ENT, cardiac, peripheral vascular, lymphatic, respiratory, GI, , Musculoskeletal, skin, Neurological) was performed and found to be negative except as described in this note.       HOOS Hip Dysfunction & Osteoarthritis Outcome Questionnaire    No flowsheet data found.           [unfilled]    KOOS Knee Survey Assessment    No flowsheet data found.           Promis 10 Assessment    No flowsheet data found.           Ortho  Oxford Knee Questionnaire    No flowsheet data found.             See intake form completed by patient        Again, thank you for allowing me to participate in the care of your patient.        Sincerely,        Axel Honeycutt MD

## 2022-11-11 ENCOUNTER — TELEPHONE (OUTPATIENT)
Dept: ORTHOPEDICS | Facility: CLINIC | Age: 52
End: 2022-11-11

## 2022-11-11 NOTE — TELEPHONE ENCOUNTER
"[facility] Call Center    Phone Message    May a detailed message be left on voicemail: no     Reason for Call: Other: Patient called to scheduld \"follow-up appointment after the antibiotic holiday\"   She wasn't sure if this was just a regular clinic appt or a procedure. Wasn't clear how to schedule. Please c/b to schedule    Action Taken: Message routed to:  [facility] ([facility]    Travel Screening: Not Applicable                                                                        "

## 2022-11-11 NOTE — TELEPHONE ENCOUNTER
Writer called and talked with patient on the phone. Per Dr. Honeycutt's note pt would want to be seen 8 weeks after last does. Pt has a dental appointment on 11/23/22 and will need antibiotic that day. Patient will need to be seen 8 weeks from that date, which is 1/16/23 or 1/19/23. Pt is going to call back Monday to schedule after looking at her schedule.     Samanta Winston LPN

## 2022-11-14 NOTE — TELEPHONE ENCOUNTER
Pt had questions about Wednesdays visit and what she will need to do with patches. Discussed removal process briefly and informed her that she would be receiving printed instructions at her Monday visit and would be given everything needed for Wednesday. Pt was told by her other Dermatologist that she cannot be on prednisone while getting patch testing. Writer informed pt that patch testing is looking at delayed reactions and the prednisone doesn't effect this. Writer stated this would be double checked by allergy staff. Writer will Only reach out if what was discussed is incorrect. Pt understands and agrees with plan.

## 2022-11-15 ENCOUNTER — TRANSFERRED RECORDS (OUTPATIENT)
Dept: HEALTH INFORMATION MANAGEMENT | Facility: CLINIC | Age: 52
End: 2022-11-15

## 2022-11-17 ENCOUNTER — TELEPHONE (OUTPATIENT)
Dept: ALLERGY | Facility: CLINIC | Age: 52
End: 2022-11-17

## 2022-11-17 NOTE — TELEPHONE ENCOUNTER
STANDARD Series                                          # Substance 2 days 4 days remarks     1 Stone Mix [C] - -       2 Colophony - -       3  2-Mercaptobenzothiazole  - -       4 Methylisothiazolinone - -       5 Carba Mix - -       6 Thiuram Mix [A] - -       7 Bisphenol A Epoxy Resin - -       8 H-Ecjb-Xrarlnemzqf-Formaldehyde Resin - -       9 Mercapto Mix [A] - -       10 Black Rubber Mix- PPD [B] - -       11 Potassium Dichromate  -  -       12 Balsam of [place] (Myroxylon Pereirae Resin) - -       13 Nickel Sulphate Hexahydrate - -       14 Mixed Dialkyl Thiourea - -       15 Paraben Mix [B] - -       16 Methyldibromo Glutaronitrile - -       17 Fragrance Mix - -       18 2-Bromo-2-Nitropropane-1,3-Diol (Bronopol) CT - -       19 Lyral - -       20 Tixocortol-21- Pivalate CT - -       21 Diazolidiyl Urea (Germall II) - -       22 Methyl Methacrylate - -       23 Cobalt (II) Chloride Hexahydrate - -       24 Fragrance Mix II  - -       25 Compositae Mix - -       26 Benzoyl Peroxide - -       27 Bacitracin - -       28 Formaldehyde - -       29 Methylchloroisothiazolinone / Methylisothiazolinone - -       30 Corticosteroid Mix CT - -       31 Sodium Lauryl Sulfate - -       32 Lanolin Alcohol - -       33 Turpentine - -       34 Cetylstearylalcohol - -       35 Chlorhexidine Dicluconate - -       36 Budenoside - -       37 Imidazolidinyl Urea  - -       38 Ethyl-2 Cyanoacrylate - -       39 Quaternium 15 (Dowicil 200) - -       40 Decyl Glucoside - -       PRESERVATIVES & ANTIMICROBIALS        # Substance 2 days 4 days remarks   41 1  1,2-Benzisothiazoline-3-One, Sodium Salt - -     2  1,3,5-Darwin (2-Hydroxyethyl) - Hexahydrotriazine (Grotan BK) - -     3 6-Ssweikwnhnonw-2-Nitro-1, 3-Propanediol - -     4  3, 4, 4' - Triclocarban - -    45 5 4 - Chloro - 3 - Cresol - -     6 4 - Chloro - 4 - Xylenol (PCMX) - -     7 7-Ethylbicyclooxazolidine (Bioban [id number]) - -     8 Benzalkonium Chloride CT - -     9 Benzyl  Alcohol - -    50 10 Cetalkonium Chloride - -     11 Cetylpyrimidine Chloride  - -     12 Chloroacetamide - -     13 DMDM Hydantoin - -     14 Glutaraldehyde - -    55 15 Triclosan - -     16 Glyoxal Trimeric Dihydrate - -     17 Iodopropynyl Butylcarbamate - -     18 Octylisothiazoline - -     19 Bithionol CT - -    60 20 Bioban P 1487 (Nitrobutyl) Morpholine/(Ethylnitro-Trimethylene) Dimorpholine - -     21 Phenoxyethanol - -     22 Phenyl Salicylate - -     23 Povidone Iodine - -     24 Sodium Benzoate - -    65 25 Sodium Disulfite - -     26 Sorbic Acid - -     27 Thimerosal - -     28 Melamine Formaldehyde Resin - -     29 Ethylenediamine Dihydrochloride - -      Parabens      70 30 Butyl-P-Hydroxybenzoate - -     31 Ethyl-P-Hydroxybenzoate - -     32 Methyl-P-Hydroxybenzoate - -    73 33 Propyl-P-Hydroxybenzoate - -      PLASTICS        # Substance 2 days 4 days remarks     Acrylates - -    74 1 2-Hydroxyethyl Methacrylate (HEMA) - -    75 2 1,4-Butandioldimethacrylate (BUDMA) - -     3  2-Ethylhexyl Acrylate - -     4 Bisphenol-A-Dimethacrylate  - -     5 Diurethane-Dimethacrylate - -     6 Ethyleneglycoldimethacrylate (EGDMA) - -    80 7 Pentaerythritoltriacrylate (KELLI) - -     8 Triethylene Glycol Dimethacrylate (TEGDMA) - -      Synthetic material/additives        9 F-Yequ-Nnhjpmilguz - -     10 Tricresyl Phosphate - -     11 2-Yusd-Kfgxugldtkxqz - -    85 12 Bis (2-Ethylhexyl) Phthalate - -     13 Dibutylphthalate - -     14 Dimethylphthalate - -     15 Toluene-2,4-Diisocyanate - -     16 Diphenylmethane-4,4''-Diisocyanate - -      EPOXY RESIN SYSTEMS        Reactive Solvents - -    90 17 Cresyl Glycidyl Ether - -     18 Butyl Glycidyl Ether - -     19 Phenyl Glycidyl Ether - -     20 1,4-Butanediol Diglycidyl Ether - -     21 1,6-Hexanediole Diglycidyl Ether - -      Hardener / Accelerator - -    95 22 Triethylenetetramine - -     23 Diethylenetriamine - -     24 Isophorone Diamine (IPD) - -    98 25  N,N-Dimethyl-P-Toluidine - -      METALS (Implants / Dental)        # Substance 2 days 4 days remarks   99 1 Ammonium Heptamolybdate (IV) - -    100 2 Ammonium Tetrachloroplatinate - -     3 Indium (III) Chloride - -     4 Iridium (III) Chloride - -     5 Ferric Chloride - -     6 Manganese (II) Chloride - -    105 7 Niobium (V) Chloride - -     8 Palladium Chloride - -     9 Silver Nitrate - -     10 Gold Sodium Thiosulfate - -     11 Tantal - -    110 12 Tin (II) Chloride - -     13 Titanium (IV) Oxide - -     14 Titanium - -     15 Tungstic Acid, Sod Salt Dihydrat - -     16 Vanadium Pentoxide - -    115 17 Wolfram - -     18 Zinc Chloride - -     19 Zirconium (IV) Oxide - -     20 Ammoniated Murcury - -     21 Copper Sulfate Pentahydrate - -    120 22 Amalgam  - -     23 Aluminum Hydroxide - -    122 24 Platinum Tetrachloride - -        ANTIBIOTICS & ANTIMYCOTICS    # Substance 2 days 4 days remarks   123 1 Erythromycine - -     2 Framycetine Sulphate - -    125 3 Fusidic Acid Sodium Salt - -     4 Gentamicin Sulphate - -     5 Neomycine Sulphate - -     6 Oxytetracycline  - -     7 Polymyxin B Sulphate - -    130 8 Tetracycline-HCL - -     9 Sulfanilamide - -     10 Metronidazole - -     11 Oxyquinoline Mix - -     12 Nitrofurazone - -      Tobramycine    T-050 [facility]   135 13 Nystatin - -     14 Clotrimazole - -    137 15 Clioquinol 5% - -      Miconazole  - - M-027 [facility]         Results of patch tests:                         Interpretation:  - Negative                    A    = Allergic      (+) Erythema    TI   = Toxic/irritant   + E + Infiltration    RaP = Relevance at Present     ++ E/I + Papulovesicle   Rpr  = Relevance Previously     +++ E/I/P + Blister     nR   = No Relevance

## 2022-11-17 NOTE — PROGRESS NOTES
"[facility] Dermato-allergology Note  Office visit  Encounter Date: Nov 21, 2022  ____________________________________________    CC: No chief complaint on file.      HPI:  (Nov 21, 2022)  Ms. Nayely Wesley is a(n) 52 year old female who presents today as a return patient for allergy tests as planned  - Follow-up in Derm-Allergy clinic for patch testing  - otherwise feeling well in usual state of health    Physical exam:  General: In no acute distress, well-developed, well-nourished  Eyes: no conjunctivitis  ENT: no signs of rhinitis   Pulmonary: no wheezing or coughing  Skin: Focused examination of the skin on test sites was performed = see test results below  No active eczematous skin lesions on tests sites, particularly back    Earlier History and Allergy exams:  (Nov 2, 2022)  - In the end of 2016 had bilateral knee replacement   - Has high inflammatory markers   - When she went to have knees replaced, orthopedic surgeon said that he thought she had autoimmune disease   - Since knees replaced in 2016, both legs are burning at night almost every night   - \"Feels like something attacking me from inside out\"  - Knees have been okay   - Over the years, symmetric arthritis especially hips over the summer   - In September they went to a [employer] festival and woke up at night and this night it was the worst she ever felt   - Legs feel objectively \"burning and on fire\"  - At one point left knee was swollen up like a basketball   - They adam fluid off the leg but WBC was 50,000 so they did an emergency surgery  - status post left knee irrigation and debridement, left total knee explant, and antibiotic spacer placement preformed on 9/19/2022  - otherwise feeling well in usual state of health    Family history of lupus   > 8 miscarriages   History of polymalgia rheumatica     No eczema, no history of asthma or seasonal allergies   History of skin cancer    Never had a problem with fashion jewelery or metals "   Recently has had some reaction to adhesives       Past Medical History:   Patient Active Problem List   Diagnosis     Morbid obesity (H)     No past medical history on file.    Allergies:  Allergies   Allergen Reactions     Bupropion Hives     Hydrocodone-Acetaminophen Anaphylaxis     Pt had taken Vicodin, norflex and medrol all at same time  Pt had taken Vicodin, norflex and medrol all at same time  Pt had taken Vicodin, norflex and medrol all at same time       Gadolinium Derivatives Swelling     Cat Hair Extract Other (See Comments)     Difficult breathing     No Clinical Screening - See Comments      2009 - had Medrol pack, vicodin and norflex  together for pain med treatment - had tongue swell,  face swell and SOB symptoms - has taken each separate without problem. Do not use together.      Adhesive Tape Rash     Band aids       Medications:  Current Outpatient Medications   Medication     amoxicillin (AMOXIL) 500 MG capsule     aspirin (ASA) 325 MG tablet     busPIRone (BUSPAR) 15 MG tablet     cetirizine (ZYRTEC) 10 MG tablet     cholecalciferol 50 MCG (2000 UT) CAPS     hydrOXYzine (VISTARIL) 25 MG capsule     linaclotide (LINZESS) 290 MCG capsule     losartan (COZAAR) 25 MG tablet     Magnesium 400 MG TABS     metFORMIN (GLUCOPHAGE XR) 500 MG 24 hr tablet     methocarbamol (ROBAXIN) 500 MG tablet     oxyCODONE (ROXICODONE) 5 MG tablet     POTASSIUM CHLORIDE ER PO     predniSONE (DELTASONE) 5 MG tablet     Probiotic Product (PROBIOTIC-10 PO)     senna-docusate (SENOKOT-S/PERICOLACE) 8.6-50 MG tablet     spironolactone (ALDACTONE) 25 MG tablet     torsemide (DEMADEX) 20 MG tablet     traZODone (DESYREL) 50 MG tablet     No current facility-administered medications for this visit.       Social History:  The patient worked as a senior travel planner.     Family History:  No family history on file.    Previous Labs, Allergy Tests, Dermatopathology, Imaging:  Recent BMP shows elevated Cr 1.10 with reduced  GFR  WBC normal, Hgb low at 9.4   CRP 3.9 ESR >89        Referred By: No referring provider defined for this encounter.     Allergy Tests:    Past Allergy Test    Order for Future Allergy Testing:    [] Outpatient  [] Inpatient: [place]..../ Bed ....       Skin Atopy (atopic dermatitis) [] Yes   [x] No .........  Contact allergies:   [x] Yes   [] No .implant material?.  Hand eczema:   [] Yes   [x] No           Leading hand:   [] R   [] L       [] Ambidextrous         Drug allergies:        [] Yes   [x] No  which?......    Urticaria/Angioedema  [] Yes   [x] No .........  Food Allergy:  [] Yes   [x] No  which?......  Pets :  [] Yes   [x] No  which?......         []  Rhinitis   [] Conjunctivitis   [] Sinusitis   [] Polyposis   [] Otitis   [] Pharyngitis         []  Postnasal drip    [x]  none  Operations:   [] Tonsils   [] Septum   [] Sinus   [] Polyposis        [] Asthma bronchiale   [] Coughing      []  none  Symptoms (mostly Rhinoconjunctivitis and Asthma) aggravated by:  Season   [] I   [] II   [] III   [] IV   []V   []VI   []VII   []VIII   []IX   []X   []XI   []XII     [] perennial   Day time      [] morning   [] noon      [] evening        [] night    [] whole day........  []  none  Location/changes    [] inside        [] outside   [] mountains    [] sea     [] others.............   []  none  Triggers, specific     [] animals     [] plants     [] dust              [] others ...........................    []  none  Triggers, others       [] work          [] psyche    [] sport            [] others .............................  []  none  Irritant                [] phys efforts [] smoke    [] heat/cold     [] odors  []others............... []  none    Order for PATCH TESTS  Reason for tests (suspected allergy): reaction to implant material?  Known previous allergies: none  Standardized panels  [x] Standard panel (40 tests)  [x] Preservatives & Antimicrobials (31 tests)  [] Emulsifiers & Additives (25 tests)   []  Perfumes/Flavours & Plants (25 tests)  [] Hairdresser panel (12 tests)   [] Rubber Chemicals (22 tests)  [x] Plastics (26 tests)  [] Colorants/Dyes/Food additives (20 tests)  [x] Metals (implants/dental) (24 tests)  [] Local anaesthetics/NSAIDs (13 tests)  [x] Antibiotics & Antimycotics (14 tests)   [] Corticosteroids (15 tests)   [] Photopatch test (62 tests)   [] others: ...      [] Patient's own products: ...    DO NOT test if chemical or biological identity is unknown!     always ask from patient the product information and safety sheets (MSDS)       Order for PRICK TESTS    Reason for tests (suspected allergy): not necessary  Known previous allergies: n/a    Standardized prick panels  [] Atopic panel (20 tests)  [] Pediatric Panel (12 tests)  [] Milk, Meat, Eggs, Grains (20 tests)   [] Dust, Epithelia, Feathers (10 tests)  [] Fish, Seafood, Shellfish (17 tests)  [] Nuts, Beans (14 tests)  [] Spice, Vegetable, Fruit (17 tests)  [] Pollen Panel = Tree, Grass, Weed (24 tests)  [] Others: ...      [] Patient's own products: ...    DO NOT test if chemical or biological identity is unknown!     always ask from patient the product information and safety sheets (MSDS)     Standardized intradermal tests  [] Penicillium notatum [] Aspergillus fumigatus [] House dust mites D.far & D. pteron  [] Cat    [] dog  [] Others: ...  [] Bee venom   [] Wasp venom  !!Specific protocol with dilutions!!       Order for Drug allergy tests (prick & Intradermal & patch tests)    [] Penicillin G  [] Ampicillin [] Cefazolin   [] Ceftriaxone   [] Ceftazidime  [] Bactrim    [] Others: ...  Order for ... as test date    ________________________________  RESULTS & EVALUATION of PATCH TESTS    Nov 21, 2022 application of patch tests:    Patch test readings after     [x] 2 days, [] 3 days [x] 4 days, [] 5 days,  Other duration: ...    STANDARD Series                                          # Substance 2 days 4 days remarks     1 Stone Mix [C] -  -       2 Colophony - -       3  2-Mercaptobenzothiazole  - -       4 Methylisothiazolinone - -       5 Carba Mix - -       6 Thiuram Mix [A] - -       7 Bisphenol A Epoxy Resin - -       8 Z-Nctx-Knltsmqcwrv-Formaldehyde Resin - -       9 Mercapto Mix [A] - -       10 Black Rubber Mix- PPD [B] - -       11 Potassium Dichromate  -  -       12 Balsam of [place] (Myroxylon Pereirae Resin) - -       13 Nickel Sulphate Hexahydrate - -       14 Mixed Dialkyl Thiourea - -       15 Paraben Mix [B] - -       16 Methyldibromo Glutaronitrile - -       17 Fragrance Mix - -       18 2-Bromo-2-Nitropropane-1,3-Diol (Bronopol) CT - -       19 Lyral - -       20 Tixocortol-21- Pivalate CT - -       21 Diazolidiyl Urea (Germall II) - -       22 Methyl Methacrylate - -       23 Cobalt (II) Chloride Hexahydrate - -       24 Fragrance Mix II  - -       25 Compositae Mix - -       26 Benzoyl Peroxide - -       27 Bacitracin - -       28 Formaldehyde - -       29 Methylchloroisothiazolinone / Methylisothiazolinone - -       30 Corticosteroid Mix CT - -       31 Sodium Lauryl Sulfate - -       32 Lanolin Alcohol - -       33 Turpentine - -       34 Cetylstearylalcohol - -       35 Chlorhexidine Dicluconate - -       36 Budenoside - -       37 Imidazolidinyl Urea  - -       38 Ethyl-2 Cyanoacrylate NA NA       39 Quaternium 15 (Dowicil 200) - -       40 Decyl Glucoside - -       PRESERVATIVES & ANTIMICROBIALS        # Substance 2 days 4 days remarks   41 1  1,2-Benzisothiazoline-3-One, Sodium Salt - -     2  1,3,5-Darwin (2-Hydroxyethyl) - Hexahydrotriazine (Grotan BK) - -     3 1-Elibcnynqitlh-4-Nitro-1, 3-Propanediol NA NA     4  3, 4, 4' - Triclocarban - -    45 5 4 - Chloro - 3 - Cresol - -     6 4 - Chloro - 4 - Xylenol (PCMX) - -     7 7-Ethylbicyclooxazolidine (Bioban [id number]) - -     8 Benzalkonium Chloride CT - -     9 Benzyl Alcohol - -    50 10 Cetalkonium Chloride - -     11 Cetylpyrimidine Chloride  - -     12 Chloroacetamide - -      13 DMDM Hydantoin - -     14 Glutaraldehyde - -    55 15 Triclosan - -     16 Glyoxal Trimeric Dihydrate - -     17 Iodopropynyl Butylcarbamate - -     18 Octylisothiazoline - -     19 Bithionol CT - -    60 20 Bioban P 1487 (Nitrobutyl) Morpholine/(Ethylnitro-Trimethylene) Dimorpholine - -     21 Phenoxyethanol - -     22 Phenyl Salicylate - -     23 Povidone Iodine - -     24 Sodium Benzoate - -    65 25 Sodium Disulfite - -     26 Sorbic Acid - -     27 Thimerosal - -     28 Melamine Formaldehyde Resin - -     29 Ethylenediamine Dihydrochloride - -      Parabens      70 30 Butyl-P-Hydroxybenzoate - -     31 Ethyl-P-Hydroxybenzoate NA NA     32 Methyl-P-Hydroxybenzoate - -    73 33 Propyl-P-Hydroxybenzoate - -      PLASTICS        # Substance 2 days 4 days remarks     Acrylates - -    74 1 2-Hydroxyethyl Methacrylate (HEMA) - -    75 2 1,4-Butandioldimethacrylate (BUDMA) - -     3  2-Ethylhexyl Acrylate - -     4 Bisphenol-A-Dimethacrylate  - -     5 Diurethane-Dimethacrylate - -     6 Ethyleneglycoldimethacrylate (EGDMA) - -    80 7 Pentaerythritoltriacrylate (KELLI) - -     8 Triethylene Glycol Dimethacrylate (TEGDMA) - -      Synthetic material/additives        9 H-Ntuz-Zfmamkcexoi - -     10 Tricresyl Phosphate - -     11 9-Gmvh-Hfrfwoygbmgtk - -    85 12 Bis (2-Ethylhexyl) Phthalate - -     13 Dibutylphthalate - -     14 Dimethylphthalate - -     15 Toluene-2,4-Diisocyanate - -     16 Diphenylmethane-4,4''-Diisocyanate - -      EPOXY RESIN SYSTEMS        Reactive Solvents - -    90 17 Cresyl Glycidyl Ether - -     18 Butyl Glycidyl Ether - -     19 Phenyl Glycidyl Ether - -     20 1,4-Butanediol Diglycidyl Ether - -     21 1,6-Hexanediole Diglycidyl Ether - -      Hardener / Accelerator - -    95 22 Triethylenetetramine - -     23 Diethylenetriamine - -     24 Isophorone Diamine (IPD) - -    98 25 N,N-Dimethyl-P-Toluidine - -      METALS (Implants / Dental)        # Substance 2 days 4 days remarks   99 1  Ammonium Heptamolybdate (IV) - -    100 2 Ammonium Tetrachloroplatinate - -     3 Indium (III) Chloride - -     4 Iridium (III) Chloride - -     5 Ferric Chloride - -     6 Manganese (II) Chloride - -    105 7 Niobium (V) Chloride - -     8 Palladium Chloride - -     9 Silver Nitrate - -     10 Gold Sodium Thiosulfate - -     11 Tantal - -    110 12 Tin (II) Chloride - -     13 Titanium (IV) Oxide - -     14 Titanium - -     15 Tungstic Acid, Sod Salt Dihydrat - -     16 Vanadium Pentoxide - -    115 17 Wolfram - -     18 Zinc Chloride - -     19 Zirconium (IV) Oxide - -     20 Ammoniated Murcury - -     21 Copper Sulfate Pentahydrate - -    120 22 Amalgam  - -     23 Aluminum Hydroxide - -    122 24 Platinum Tetrachloride - -        ANTIBIOTICS & ANTIMYCOTICS    # Substance 2 days 4 days remarks   123 1 Erythromycine - -     2 Framycetine Sulphate - -    125 3 Fusidic Acid Sodium Salt - -     4 Gentamicin Sulphate - -     5 Neomycine Sulphate - -     6 Oxytetracycline  - -     7 Polymyxin B Sulphate - -    130 8 Tetracycline-HCL - -     9 Sulfanilamide - -     10 Metronidazole - -     11 Oxyquinoline Mix - -     12 Nitrofurazone - -      Tobramycine    T-050 [facility]   135 13 Nystatin - -     14 Clotrimazole - -    137 15 Clioquinol 5% - -      Miconazole  - - M-027 [facility]         Results of patch tests:                         Interpretation:  - Negative                    A    = Allergic      (+) Erythema    TI   = Toxic/irritant   + E + Infiltration    RaP = Relevance at Present     ++ E/I + Papulovesicle   Rpr  = Relevance Previously     +++ E/I/P + Blister     nR   = No Relevance      [] No relevant allergic reaction observed    [] Allergic reaction diagnosed against following allergens:      Interpretation/ remarks:   See later    [] Patient information given   [] ACDS CAMP information's (# ....) to following compounds: .....   [] General information's to following compounds: ......      Assessment &  Plan:    ==> Final Diagnosis:     # Possible allergy to metal, cement, or other materials used in knee replacement.   - Patch testing (will need someone to read for her on Wednesday b/c lives 3 hours away)   * chronic illness with exacerbation, progression, side effects from treatment    # recurrent tongue and neck swelling (random) without breathing problems  - DDx ARB induced (Losartan) --> certainly avoid ACE inhibitors, but sometimes we see similar reactions with ARBs such as Losartan  - DDx NSAID (Cox1 Intolerance) to Aspirin, high dose Tylenol or Ibuprofen/Naproxen  - unlikely H4Kknvdvwq inh deficiency (was then normal)  * chronic illness with exacerbation, progression, side effects from treatment    These conclusions are made at the best of one's knowledge and belief based on the provided evidence such as patient's history and allergy test results and they can change over time or can be incomplete because of missing information's.    ==> Treatment Plan:  Monday (in person), Wednesday (photos), Friday (in person)     Procedures Performed: Allergy tests, including patch tests    Staff: : provider    Follow-up in Derm-Allergy clinic for 1st readings of patch tests after 2 days and 2nd readings and final conclusions after 4 days   ___________________________    I spent a total of 25 minutes with Nayely Wesley during today s  visit. This time was spent discussing all the individual test results, correlating them to the clinical relevance, counseling the patient and/or coordinating care and performing allergy tests

## 2022-11-18 ENCOUNTER — MYC MEDICAL ADVICE (OUTPATIENT)
Dept: ORTHOPEDICS | Facility: CLINIC | Age: 52
End: 2022-11-18

## 2022-11-18 NOTE — TELEPHONE ENCOUNTER
Write called and talked with patient on the phone. Writer confirmed that pt is to come to clinic to be seen and aspiration will be done in clinic. Writer rescheduled pt to a 1:20 pm date on 1/19/23.    Samanta Wniston LPN

## 2022-11-21 ENCOUNTER — OFFICE VISIT (OUTPATIENT)
Dept: ALLERGY | Facility: CLINIC | Age: 52
End: 2022-11-21
Payer: COMMERCIAL

## 2022-11-21 DIAGNOSIS — L23.89 ALLERGIC CONTACT DERMATITIS DUE TO OTHER AGENTS: Primary | ICD-10-CM

## 2022-11-21 PROCEDURE — 95044 PATCH/APPLICATION TESTS: CPT | Mod: 59 | Performed by: DERMATOLOGY

## 2022-11-21 NOTE — NURSING NOTE
Dermatology Rooming Note    Nayely Wesley's goals for this visit include:   Chief Complaint   Patient presents with     Allergy Recheck     Patch testing day 1     Delilah Angela CMA

## 2022-11-21 NOTE — LETTER
"    11/21/2022         RE: Nayely Wesley  [id number]  [place] [address]        Dear Colleague,    Thank you for referring your patient, Nayely Wesley, to the [facility] [place]. Please see a copy of my visit note below.    [facility] Dermato-allergology Note  Office visit  Encounter Date: Nov 21, 2022  ____________________________________________    CC: No chief complaint on file.      HPI:  (Nov 21, 2022)  Ms. Nayely Wesley is a(n) 52 year old female who presents today as a return patient for allergy tests as planned  - Follow-up in Derm-Allergy clinic for patch testing  - otherwise feeling well in usual state of health    Physical exam:  General: In no acute distress, well-developed, well-nourished  Eyes: no conjunctivitis  ENT: no signs of rhinitis   Pulmonary: no wheezing or coughing  Skin: Focused examination of the skin on test sites was performed = see test results below  No active eczematous skin lesions on tests sites, particularly back    Earlier History and Allergy exams:  (Nov 2, 2022)  - In the end of 2016 had bilateral knee replacement   - Has high inflammatory markers   - When she went to have knees replaced, orthopedic surgeon said that he thought she had autoimmune disease   - Since knees replaced in 2016, both legs are burning at night almost every night   - \"Feels like something attacking me from inside out\"  - Knees have been okay   - Over the years, symmetric arthritis especially hips over the summer   - [place] [employer] [place] and woke up at night and this night it was the worst she ever felt   - Legs feel objectively \"burning and on fire\"  - At one point left knee was swollen up like a basketball   - They adam fluid off the leg but WBC was 50,000 so they did an emergency surgery  - status post left knee irrigation and debridement, left total knee explant, and antibiotic spacer placement preformed on 9/19/2022  - otherwise " feeling well in usual state of health    Family history of lupus   > 8 miscarriages   History of polymalgia rheumatica     No eczema, no history of asthma or seasonal allergies   History of skin cancer    Never had a problem with fashion jewelery or metals   Recently has had some reaction to adhesives       Past Medical History:   Patient Active Problem List   Diagnosis     Morbid obesity (H)     No past medical history on file.    Allergies:  Allergies   Allergen Reactions     Bupropion Hives     Hydrocodone-Acetaminophen Anaphylaxis     Pt had taken Vicodin, norflex and medrol all at same time  Pt had taken Vicodin, norflex and medrol all at same time  Pt had taken Vicodin, norflex and medrol all at same time       Gadolinium Derivatives Swelling     Cat Hair Extract Other (See Comments)     Difficult breathing     No Clinical Screening - See Comments      2009 - had Medrol pack, vicodin and norflex  together for pain med treatment - had tongue swell,  face swell and SOB symptoms - has taken each separate without problem. Do not use together.      Adhesive Tape Rash     Band aids       Medications:  Current Outpatient Medications   Medication     amoxicillin (AMOXIL) 500 MG capsule     aspirin (ASA) 325 MG tablet     busPIRone (BUSPAR) 15 MG tablet     cetirizine (ZYRTEC) 10 MG tablet     cholecalciferol 50 MCG (2000 UT) CAPS     hydrOXYzine (VISTARIL) 25 MG capsule     linaclotide (LINZESS) 290 MCG capsule     losartan (COZAAR) 25 MG tablet     Magnesium 400 MG TABS     metFORMIN (GLUCOPHAGE XR) 500 MG 24 hr tablet     methocarbamol (ROBAXIN) 500 MG tablet     oxyCODONE (ROXICODONE) 5 MG tablet     POTASSIUM CHLORIDE ER PO     predniSONE (DELTASONE) 5 MG tablet     Probiotic Product (PROBIOTIC-10 PO)     senna-docusate (SENOKOT-S/PERICOLACE) 8.6-50 MG tablet     spironolactone (ALDACTONE) 25 MG tablet     torsemide (DEMADEX) 20 MG tablet     traZODone (DESYREL) 50 MG tablet     No current facility-administered  medications for this visit.       Social History:  The patient worked as a senior travel planner.     Family History:  No family history on file.    Previous Labs, Allergy Tests, Dermatopathology, Imaging:  Recent BMP shows elevated Cr 1.10 with reduced GFR  WBC normal, Hgb low at 9.4   CRP 3.9 ESR >89        Referred By: No referring provider defined for this encounter.     Allergy Tests:    Past Allergy Test    Order for Future Allergy Testing:    [] Outpatient  [] Inpatient: [place]..../ Bed ....       Skin Atopy (atopic dermatitis) [] Yes   [x] No .........  Contact allergies:   [x] Yes   [] No .implant material?.  Hand eczema:   [] Yes   [x] No           Leading hand:   [] R   [] L       [] Ambidextrous         Drug allergies:        [] Yes   [x] No  which?......    Urticaria/Angioedema  [] Yes   [x] No .........  Food Allergy:  [] Yes   [x] No  which?......  Pets :  [] Yes   [x] No  which?......         []  Rhinitis   [] Conjunctivitis   [] Sinusitis   [] Polyposis   [] Otitis   [] Pharyngitis         []  Postnasal drip    [x]  none  Operations:   [] Tonsils   [] Septum   [] Sinus   [] Polyposis        [] Asthma bronchiale   [] Coughing      []  none  Symptoms (mostly Rhinoconjunctivitis and Asthma) aggravated by:  Season   [] I   [] II   [] III   [] IV   []V   []VI   []VII   []VIII   []IX   []X   []XI   []XII     [] perennial   Day time      [] morning   [] noon      [] evening        [] night    [] whole day........  []  none  Location/changes    [] inside        [] outside   [] mountains    [] sea     [] others.............   []  none  Triggers, specific     [] animals     [] plants     [] dust              [] others ...........................    []  none  Triggers, others       [] work          [] psyche    [] sport            [] others .............................  []  none  Irritant                [] phys efforts [] smoke    [] heat/cold     [] odors  []others............... []  none    Order for PATCH  TESTS  Reason for tests (suspected allergy): reaction to implant material?  Known previous allergies: none  Standardized panels  [x] Standard panel (40 tests)  [x] Preservatives & Antimicrobials (31 tests)  [] Emulsifiers & Additives (25 tests)   [] Perfumes/Flavours & Plants (25 tests)  [] Hairdresser panel (12 tests)   [] Rubber Chemicals (22 tests)  [x] Plastics (26 tests)  [] Colorants/Dyes/Food additives (20 tests)  [x] Metals (implants/dental) (24 tests)  [] Local anaesthetics/NSAIDs (13 tests)  [x] Antibiotics & Antimycotics (14 tests)   [] Corticosteroids (15 tests)   [] Photopatch test (62 tests)   [] others: ...      [] Patient's own products: ...    DO NOT test if chemical or biological identity is unknown!     always ask from patient the product information and safety sheets (MSDS)       Order for PRICK TESTS    Reason for tests (suspected allergy): not necessary  Known previous allergies: n/a    Standardized prick panels  [] Atopic panel (20 tests)  [] Pediatric Panel (12 tests)  [] Milk, Meat, Eggs, Grains (20 tests)   [] Dust, Epithelia, Feathers (10 tests)  [] Fish, Seafood, Shellfish (17 tests)  [] Nuts, Beans (14 tests)  [] Spice, Vegetable, Fruit (17 tests)  [] Pollen Panel = Tree, Grass, Weed (24 tests)  [] Others: ...      [] Patient's own products: ...    DO NOT test if chemical or biological identity is unknown!     always ask from patient the product information and safety sheets (MSDS)     Standardized intradermal tests  [] Penicillium notatum [] Aspergillus fumigatus [] House dust mites D.far & D. pteron  [] Cat    [] dog  [] Others: ...  [] Bee venom   [] Wasp venom  !!Specific protocol with dilutions!!       Order for Drug allergy tests (prick & Intradermal & patch tests)    [] Penicillin G  [] Ampicillin [] Cefazolin   [] Ceftriaxone   [] Ceftazidime  [] Bactrim    [] Others: ...  Order for ... as test date    ________________________________  RESULTS & EVALUATION of PATCH TESTS    Nov  21, 2022 application of patch tests:    Patch test readings after     [x] 2 days, [] 3 days [x] 4 days, [] 5 days,  Other duration: ...    STANDARD Series                                          # Substance 2 days 4 days remarks     1 Stone Mix [C] - -       2 Colophony - -       3  2-Mercaptobenzothiazole  - -       4 Methylisothiazolinone - -       5 Carba Mix - -       6 Thiuram Mix [A] - -       7 Bisphenol A Epoxy Resin - -       8 E-Whnp-Ddvmgufhfci-Formaldehyde Resin - -       9 Mercapto Mix [A] - -       10 Black Rubber Mix- PPD [B] - -       11 Potassium Dichromate  -  -       12 Balsam of [place] (Myroxylon Pereirae Resin) - -       13 Nickel Sulphate Hexahydrate - -       14 Mixed Dialkyl Thiourea - -       15 Paraben Mix [B] - -       16 Methyldibromo Glutaronitrile - -       17 Fragrance Mix - -       18 2-Bromo-2-Nitropropane-1,3-Diol (Bronopol) CT - -       19 Lyral - -       20 Tixocortol-21- Pivalate CT - -       21 Diazolidiyl Urea (Germall II) - -       22 Methyl Methacrylate - -       23 Cobalt (II) Chloride Hexahydrate - -       24 Fragrance Mix II  - -       25 Compositae Mix - -       26 Benzoyl Peroxide - -       27 Bacitracin - -       28 Formaldehyde - -       29 Methylchloroisothiazolinone / Methylisothiazolinone - -       30 Corticosteroid Mix CT - -       31 Sodium Lauryl Sulfate - -       32 Lanolin Alcohol - -       33 Turpentine - -       34 Cetylstearylalcohol - -       35 Chlorhexidine Dicluconate - -       36 Budenoside - -       37 Imidazolidinyl Urea  - -       38 Ethyl-2 Cyanoacrylate NA NA       39 Quaternium 15 (Dowicil 200) - -       40 Decyl Glucoside - -       PRESERVATIVES & ANTIMICROBIALS        # Substance 2 days 4 days remarks   41 1  1,2-Benzisothiazoline-3-One, Sodium Salt - -     2  1,3,5-Darwin (2-Hydroxyethyl) - Hexahydrotriazine (Grotan BK) - -     3 1-Ltmkqrcmzoxud-1-Nitro-1, 3-Propanediol NA NA     4  3, 4, 4' - Triclocarban - -    45 5 4 - Chloro - 3 - Cresol - -      6 4 - Chloro - 4 - Xylenol (PCMX) - -     7 7-Ethylbicyclooxazolidine (Bioban PJ3004) - -     8 Benzalkonium Chloride CT - -     9 Benzyl Alcohol - -    50 10 Cetalkonium Chloride - -     11 Cetylpyrimidine Chloride  - -     12 Chloroacetamide - -     13 DMDM Hydantoin - -     14 Glutaraldehyde - -    55 15 Triclosan - -     16 Glyoxal Trimeric Dihydrate - -     17 Iodopropynyl Butylcarbamate - -     18 Octylisothiazoline - -     19 Bithionol CT - -    60 20 Bioban P 1487 (Nitrobutyl) Morpholine/(Ethylnitro-Trimethylene) Dimorpholine - -     21 Phenoxyethanol - -     22 Phenyl Salicylate - -     23 Povidone Iodine - -     24 Sodium Benzoate - -    65 25 Sodium Disulfite - -     26 Sorbic Acid - -     27 Thimerosal - -     28 Melamine Formaldehyde Resin - -     29 Ethylenediamine Dihydrochloride - -      Parabens      70 30 Butyl-P-Hydroxybenzoate - -     31 Ethyl-P-Hydroxybenzoate NA NA     32 Methyl-P-Hydroxybenzoate - -    73 33 Propyl-P-Hydroxybenzoate - -      PLASTICS        # Substance 2 days 4 days remarks     Acrylates - -    74 1 2-Hydroxyethyl Methacrylate (HEMA) - -    75 2 1,4-Butandioldimethacrylate (BUDMA) - -     3  2-Ethylhexyl Acrylate - -     4 Bisphenol-A-Dimethacrylate  - -     5 Diurethane-Dimethacrylate - -     6 Ethyleneglycoldimethacrylate (EGDMA) - -    80 7 Pentaerythritoltriacrylate (KELLI) - -     8 Triethylene Glycol Dimethacrylate (TEGDMA) - -      Synthetic material/additives        9 N-Lcxa-Fcsteywgrqh - -     10 Tricresyl Phosphate - -     11 8-Dqzw-Nellmoejgxnpm - -    85 12 Bis (2-Ethylhexyl) Phthalate - -     13 Dibutylphthalate - -     14 Dimethylphthalate - -     15 Toluene-2,4-Diisocyanate - -     16 Diphenylmethane-4,4''-Diisocyanate - -      EPOXY RESIN SYSTEMS        Reactive Solvents - -    90 17 Cresyl Glycidyl Ether - -     18 Butyl Glycidyl Ether - -     19 Phenyl Glycidyl Ether - -     20 1,4-Butanediol Diglycidyl Ether - -     21 1,6-Hexanediole Diglycidyl Ether - -       Hardener / Accelerator - -    95 22 Triethylenetetramine - -     23 Diethylenetriamine - -     24 Isophorone Diamine (IPD) - -    98 25 N,N-Dimethyl-P-Toluidine - -      METALS (Implants / Dental)        # Substance 2 days 4 days remarks   99 1 Ammonium Heptamolybdate (IV) - -    100 2 Ammonium Tetrachloroplatinate - -     3 Indium (III) Chloride - -     4 Iridium (III) Chloride - -     5 Ferric Chloride - -     6 Manganese (II) Chloride - -    105 7 Niobium (V) Chloride - -     8 Palladium Chloride - -     9 Silver Nitrate - -     10 Gold Sodium Thiosulfate - -     11 Tantal - -    110 12 Tin (II) Chloride - -     13 Titanium (IV) Oxide - -     14 Titanium - -     15 Tungstic Acid, Sod Salt Dihydrat - -     16 Vanadium Pentoxide - -    115 17 Wolfram - -     18 Zinc Chloride - -     19 Zirconium (IV) Oxide - -     20 Ammoniated Murcury - -     21 Copper Sulfate Pentahydrate - -    120 22 Amalgam  - -     23 Aluminum Hydroxide - -    122 24 Platinum Tetrachloride - -        ANTIBIOTICS & ANTIMYCOTICS    # Substance 2 days 4 days remarks   123 1 Erythromycine - -     2 Framycetine Sulphate - -    125 3 Fusidic Acid Sodium Salt - -     4 Gentamicin Sulphate - -     5 Neomycine Sulphate - -     6 Oxytetracycline  - -     7 Polymyxin B Sulphate - -    130 8 Tetracycline-HCL - -     9 Sulfanilamide - -     10 Metronidazole - -     11 Oxyquinoline Mix - -     12 Nitrofurazone - -      Tobramycine    T-050 Dorm   135 13 Nystatin - -     14 Clotrimazole - -    137 15 Clioquinol 5% - -      Miconazole  - - [phone] [facility]         Results of patch tests:                         Interpretation:  - Negative                    A    = Allergic      (+) Erythema    TI   = Toxic/irritant   + E + Infiltration    RaP = Relevance at Present     ++ E/I + Papulovesicle   Rpr  = Relevance Previously     +++ E/I/P + Blister     nR   = No Relevance      [] No relevant allergic reaction observed    [] Allergic reaction diagnosed against  following allergens:      Interpretation/ remarks:   See later    [] Patient information given   [] ACDS CAMP information's (# ....) to following compounds: .....   [] General information's to following compounds: ......      Assessment & Plan:    ==> Final Diagnosis:     # Possible allergy to metal, cement, or other materials used in knee replacement.   - Patch testing (will need someone to read for her on Wednesday b/c lives 3 hours away)   * chronic illness with exacerbation, progression, side effects from treatment    # recurrent tongue and neck swelling (random) without breathing problems  - DDx ARB induced (Losartan) --> certainly avoid ACE inhibitors, but sometimes we see similar reactions with ARBs such as Losartan  - DDx NSAID (Cox1 Intolerance) to Aspirin, high dose Tylenol or Ibuprofen/Naproxen  - unlikely L6Srheixnd inh deficiency (was then normal)  * chronic illness with exacerbation, progression, side effects from treatment    These conclusions are made at the best of one's knowledge and belief based on the provided evidence such as patient's history and allergy test results and they can change over time or can be incomplete because of missing information's.    ==> Treatment Plan:  Monday (in person), Wednesday (photos), Friday (in person)     Procedures Performed: Allergy tests, including patch tests    Staff: : provider    Follow-up in Derm-Allergy clinic for 1st readings of patch tests after 2 days and 2nd readings and final conclusions after 4 days   ___________________________    I spent a total of 25 minutes with Nayely Wesley during today s  visit. This time was spent discussing all the individual test results, correlating them to the clinical relevance, counseling the patient and/or coordinating care and performing allergy tests             Again, thank you for allowing me to participate in the care of your patient.        Sincerely,        Royce De Leon MD

## 2022-11-21 NOTE — PATIENT INSTRUCTIONS
Removal of Patch Tests on Day 3:    Remove patches and tape from one test area (one rectangle)          Using the purple surgical markers provided (or other permanent marker), draw a grid around the test area so that the circular indentation is in the center of each square, as below. Try to be as neat as possible and keep the lines as straight as you can (you can use a ruler if you need to)          Redraw the number that was underneath the tape above the grids, as shown below. Try to print clearly.          Repeat the process for the remaining test areas.          Photograph the entire area then take close-up photos of any possible reactions. Examples of possible reactions are spots that look like these:          Return to clinic for evaluation as instructed. You should continue to avoid getting the area wet (no showering or strenuous exercise), exposing the area to UV light, using topical steroids, or scratching.         Who should I call with questions?  [facility], [place]: [phone]  For urgent needs outside of business hours call the [facility] at [phone] and ask for the dermatology resident on call      If you develop any serious or adverse allergic reaction after office hours please seek immediate medical assistance at the nearest clinic or emergency room

## 2022-11-22 NOTE — PROGRESS NOTES
"Formerly [facility] Dermato-allergology Note  Virtual visit: store and forward video (Slice connected), start time: 1:25pm, end time: 1:45pm, date of images: during video and in Epic media  Encounter Date: Nov 23, 2022  ____________________________________________    CC: No chief complaint on file.      HPI:  (Nov 23, 2022)  Ms. Nayely Wesley is a(n) 52 year old female who presents today as a return patient for allergy consultation  - Follow-up in Derm-Allergy clinic for 1st readings of patch tests after 2 days (virtual)  - otherwise feeling well in usual state of health    Physical exam:  General: In no acute distress, well-developed, well-nourished  Eyes: no conjunctivitis  ENT: no signs of rhinitis   Pulmonary: no wheezing or coughing  Skin:Focused examination of the skin on test sites was performed = see test results below    Earlier History and Allergy exams:  (Nov 21, 2022)  - Follow-up in Derm-Allergy clinic for patch testing    Earlier History and Allergy exams:  (Nov 2, 2022)  - In the end of 2016 had bilateral knee replacement   - Has high inflammatory markers   - When she went to have knees replaced, orthopedic surgeon said that he thought she had autoimmune disease   - Since knees replaced in 2016, both legs are burning at night almost every night   - \"Feels like something attacking me from inside out\"  - Knees have been okay   - Over the years, symmetric arthritis especially hips over the summer   - In September they went to a bbq festival and woke up at night and this night it was the worst she ever felt   - Legs feel objectively \"burning and on fire\"  - At one point left knee was swollen up like a basketball   - They adam fluid off the leg but WBC was 50,000 so they did an emergency surgery  - status post left knee irrigation and debridement, left total knee explant, and antibiotic spacer placement preformed on 9/19/2022  - otherwise feeling well in usual state of health    Family history " of lupus   > 8 miscarriages   History of polymalgia rheumatica     No eczema, no history of asthma or seasonal allergies   History of skin cancer    Never had a problem with fashion jewelery or metals   Recently has had some reaction to adhesives       Past Medical History:   Patient Active Problem List   Diagnosis     Morbid obesity (H)     No past medical history on file.    Allergies:  Allergies   Allergen Reactions     Bupropion Hives     Hydrocodone-Acetaminophen Anaphylaxis     Pt had taken Vicodin, norflex and medrol all at same time  Pt had taken Vicodin, norflex and medrol all at same time  Pt had taken Vicodin, norflex and medrol all at same time       Gadolinium Derivatives Swelling     Cat Hair Extract Other (See Comments)     Difficult breathing     No Clinical Screening - See Comments      2009 - had Medrol pack, vicodin and norflex  together for pain med treatment - had tongue swell,  face swell and SOB symptoms - has taken each separate without problem. Do not use together.      Adhesive Tape Rash     Band aids       Medications:  Current Outpatient Medications   Medication     amoxicillin (AMOXIL) 500 MG capsule     aspirin (ASA) 325 MG tablet     busPIRone (BUSPAR) 15 MG tablet     cetirizine (ZYRTEC) 10 MG tablet     cholecalciferol 50 MCG (2000 UT) CAPS     hydrOXYzine (VISTARIL) 25 MG capsule     linaclotide (LINZESS) 290 MCG capsule     losartan (COZAAR) 25 MG tablet     Magnesium 400 MG TABS     metFORMIN (GLUCOPHAGE XR) 500 MG 24 hr tablet     methocarbamol (ROBAXIN) 500 MG tablet     oxyCODONE (ROXICODONE) 5 MG tablet     POTASSIUM CHLORIDE ER PO     predniSONE (DELTASONE) 5 MG tablet     Probiotic Product (PROBIOTIC-10 PO)     senna-docusate (SENOKOT-S/PERICOLACE) 8.6-50 MG tablet     spironolactone (ALDACTONE) 25 MG tablet     torsemide (DEMADEX) 20 MG tablet     traZODone (DESYREL) 50 MG tablet     No current facility-administered medications for this visit.       Social History:  The  patient worked as a senior travel planner.     Family History:  No family history on file.    Previous Labs, Allergy Tests, Dermatopathology, Imaging:  Recent BMP shows elevated Cr 1.10 with reduced GFR  WBC normal, Hgb low at 9.4   CRP 3.9 ESR >89        Referred By: No referring provider defined for this encounter.     Allergy Tests:    Past Allergy Test    Order for Future Allergy Testing:    [] Outpatient  [] Inpatient: [place]..../ Bed ....       Skin Atopy (atopic dermatitis) [] Yes   [x] No .........  Contact allergies:   [x] Yes   [] No .implant material?.  Hand eczema:   [] Yes   [x] No           Leading hand:   [] R   [] L       [] Ambidextrous         Drug allergies:        [] Yes   [x] No  which?......    Urticaria/Angioedema  [] Yes   [x] No .........  Food Allergy:  [] Yes   [x] No  which?......  Pets :  [] Yes   [x] No  which?......         []  Rhinitis   [] Conjunctivitis   [] Sinusitis   [] Polyposis   [] Otitis   [] Pharyngitis         []  Postnasal drip    [x]  none  Operations:   [] Tonsils   [] Septum   [] Sinus   [] Polyposis        [] Asthma bronchiale   [] Coughing      []  none  Symptoms (mostly Rhinoconjunctivitis and Asthma) aggravated by:  Season   [] I   [] II   [] III   [] IV   []V   []VI   []VII   []VIII   []IX   []X   []XI   []XII     [] perennial   Day time      [] morning   [] noon      [] evening        [] night    [] whole day........  []  none  Location/changes    [] inside        [] outside   [] mountains    [] sea     [] others.............   []  none  Triggers, specific     [] animals     [] plants     [] dust              [] others ...........................    []  none  Triggers, others       [] work          [] psyche    [] sport            [] others .............................  []  none  Irritant                [] phys efforts [] smoke    [] heat/cold     [] odors  []others............... []  none    Order for PATCH TESTS  Reason for tests (suspected allergy): reaction to  implant material?  Known previous allergies: none  Standardized panels  [x] Standard panel (40 tests)  [x] Preservatives & Antimicrobials (31 tests)  [] Emulsifiers & Additives (25 tests)   [] Perfumes/Flavours & Plants (25 tests)  [] Hairdresser panel (12 tests)   [] Rubber Chemicals (22 tests)  [x] Plastics (26 tests)  [] Colorants/Dyes/Food additives (20 tests)  [x] Metals (implants/dental) (24 tests)  [] Local anaesthetics/NSAIDs (13 tests)  [x] Antibiotics & Antimycotics (14 tests)   [] Corticosteroids (15 tests)   [] Photopatch test (62 tests)   [] others: ...      [] Patient's own products: ...    DO NOT test if chemical or biological identity is unknown!     always ask from patient the product information and safety sheets (MSDS)       Order for PRICK TESTS    Reason for tests (suspected allergy): not necessary  Known previous allergies: n/a    Standardized prick panels  [] Atopic panel (20 tests)  [] Pediatric Panel (12 tests)  [] Milk, Meat, Eggs, Grains (20 tests)   [] Dust, Epithelia, Feathers (10 tests)  [] Fish, Seafood, Shellfish (17 tests)  [] Nuts, Beans (14 tests)  [] Spice, Vegetable, Fruit (17 tests)  [] Pollen Panel = Tree, Grass, Weed (24 tests)  [] Others: ...      [] Patient's own products: ...    DO NOT test if chemical or biological identity is unknown!     always ask from patient the product information and safety sheets (MSDS)     Standardized intradermal tests  [] Penicillium notatum [] Aspergillus fumigatus [] House dust mites D.far & D. pteron  [] Cat    [] dog  [] Others: ...  [] Bee venom   [] Wasp venom  !!Specific protocol with dilutions!!       Order for Drug allergy tests (prick & Intradermal & patch tests)    [] Penicillin G  [] Ampicillin [] Cefazolin   [] Ceftriaxone   [] Ceftazidime  [] Bactrim    [] Others: ...  Order for ... as test date    ________________________________  RESULTS & EVALUATION of PATCH TESTS    Nov 21, 2022 application of patch tests:    Patch test  readings after     [x] 2 days, [] 3 days [x] 4 days, [] 5 days,  Other duration: ...    STANDARD Series                                          # Substance 2 days 4 days remarks     1 Stone Mix [C] - -       2 Colophony - -       3  2-Mercaptobenzothiazole  - -       4 Methylisothiazolinone - -       5 Carba Mix - -       6 Thiuram Mix [A] - -       7 Bisphenol A Epoxy Resin - -       8 O-Aiqs-Qutbxybiknr-Formaldehyde Resin - -       9 Mercapto Mix [A] - -       10 Black Rubber Mix- PPD [B] - -       11 Potassium Dichromate  -  -       12 Balsam of [place] (Myroxylon Pereirae Resin) - -       13 Nickel Sulphate Hexahydrate - -       14 Mixed Dialkyl Thiourea - -       15 Paraben Mix [B] - -       16 Methyldibromo Glutaronitrile - -       17 Fragrance Mix - -       18 2-Bromo-2-Nitropropane-1,3-Diol (Bronopol) CT - -       19 Lyral - -       20 Tixocortol-21- Pivalate CT - -       21 Diazolidiyl Urea (Germall II) - -       22 Methyl Methacrylate - -       23 Cobalt (II) Chloride Hexahydrate - -       24 Fragrance Mix II  - -       25 Compositae Mix - -       26 Benzoyl Peroxide - -       27 Bacitracin - -       28 Formaldehyde - -       29 Methylchloroisothiazolinone / Methylisothiazolinone - -       30 Corticosteroid Mix CT - -       31 Sodium Lauryl Sulfate - -       32 Lanolin Alcohol - -       33 Turpentine - -       34 Cetylstearylalcohol - -       35 Chlorhexidine Dicluconate - -       36 Budenoside - -       37 Imidazolidinyl Urea  - -       38 Ethyl-2 Cyanoacrylate NA NA       39 Quaternium 15 (Dowicil 200) - -       40 Decyl Glucoside - -       PRESERVATIVES & ANTIMICROBIALS        # Substance 2 days 4 days remarks   41 1  1,2-Benzisothiazoline-3-One, Sodium Salt - -     2  1,3,5-Darwin (2-Hydroxyethyl) - Hexahydrotriazine (Grotan BK) - -     3 6-Priicjegibemt-9-Nitro-1, 3-Propanediol NA NA     4  3, 4, 4' - Triclocarban - -    45 5 4 - Chloro - 3 - Cresol - -     6 4 - Chloro - 4 - Xylenol (PCMX) - -     7  7-Ethylbicyclooxazolidine (Bioban LC0236) - -     8 Benzalkonium Chloride CT - -     9 Benzyl Alcohol - -    50 10 Cetalkonium Chloride - -     11 Cetylpyrimidine Chloride  - -     12 Chloroacetamide - -     13 DMDM Hydantoin - -     14 Glutaraldehyde - -    55 15 Triclosan - -     16 Glyoxal Trimeric Dihydrate - -     17 Iodopropynyl Butylcarbamate - -     18 Octylisothiazoline - -     19 Bithionol CT - -    60 20 Bioban P 1487 (Nitrobutyl) Morpholine/(Ethylnitro-Trimethylene) Dimorpholine - -     21 Phenoxyethanol - -     22 Phenyl Salicylate - -     23 Povidone Iodine - -     24 Sodium Benzoate - -    65 25 Sodium Disulfite - -     26 Sorbic Acid - -     27 Thimerosal - -     28 Melamine Formaldehyde Resin - -     29 Ethylenediamine Dihydrochloride - -      Parabens      70 30 Butyl-P-Hydroxybenzoate - -     31 Ethyl-P-Hydroxybenzoate NA NA     32 Methyl-P-Hydroxybenzoate - -    73 33 Propyl-P-Hydroxybenzoate - -      PLASTICS        # Substance 2 days 4 days remarks     Acrylates - -    74 1 2-Hydroxyethyl Methacrylate (HEMA) - -    75 2 1,4-Butandioldimethacrylate (BUDMA) - -     3  2-Ethylhexyl Acrylate - -     4 Bisphenol-A-Dimethacrylate  - -     5 Diurethane-Dimethacrylate - -     6 Ethyleneglycoldimethacrylate (EGDMA) - -    80 7 Pentaerythritoltriacrylate (KELLI) - -     8 Triethylene Glycol Dimethacrylate (TEGDMA) - -      Synthetic material/additives        9 W-Sjrb-Uissexgpszs - -     10 Tricresyl Phosphate - -     11 8-Jbbv-Fbcacglmaaarn - -    85 12 Bis (2-Ethylhexyl) Phthalate - -     13 Dibutylphthalate - -     14 Dimethylphthalate +/++ -     15 Toluene-2,4-Diisocyanate - -     16 Diphenylmethane-4,4''-Diisocyanate - -      EPOXY RESIN SYSTEMS        Reactive Solvents - -    90 17 Cresyl Glycidyl Ether - -     18 Butyl Glycidyl Ether - -     19 Phenyl Glycidyl Ether - -     20 1,4-Butanediol Diglycidyl Ether - -     21 1,6-Hexanediole Diglycidyl Ether - -      Hardener / Accelerator - -    28 61  Triethylenetetramine - -     23 Diethylenetriamine - -     24 Isophorone Diamine (IPD) - -    98 25 N,N-Dimethyl-P-Toluidine - -      METALS (Implants / Dental)        # Substance 2 days 4 days remarks   99 1 Ammonium Heptamolybdate (IV) - -    100 2 Ammonium Tetrachloroplatinate - -     3 Indium (III) Chloride - -     4 Iridium (III) Chloride - -     5 Ferric Chloride - -     6 Manganese (II) Chloride - -    105 7 Niobium (V) Chloride - -     8 Palladium Chloride - -     9 Silver Nitrate - -     10 Gold Sodium Thiosulfate - -     11 Tantal - -    110 12 Tin (II) Chloride - -     13 Titanium (IV) Oxide - -     14 Titanium - -     15 Tungstic Acid, Sod Salt Dihydrat - -     16 Vanadium Pentoxide - -    115 17 Wolfram - -     18 Zinc Chloride - -     19 Zirconium (IV) Oxide - -     20 Ammoniated Murcury - -     21 Copper Sulfate Pentahydrate - -    120 22 Amalgam  - -     23 Aluminum Hydroxide - -    122 24 Platinum Tetrachloride - -        ANTIBIOTICS & ANTIMYCOTICS    # Substance 2 days 4 days remarks   123 1 Erythromycine - -     2 Framycetine Sulphate - -    125 3 Fusidic Acid Sodium Salt - -     4 Gentamicin Sulphate - -     5 Neomycine Sulphate - -     6 Oxytetracycline  - -     7 Polymyxin B Sulphate - -    130 8 Tetracycline-HCL - -     9 Sulfanilamide - -     10 Metronidazole - -     11 Oxyquinoline Mix - -     12 Nitrofurazone - -      Tobramycine    T-050 [address]   135 13 Nystatin - -     14 Clotrimazole - -    137 15 Clioquinol 5% - -      Miconazole  - - M-[id number] [facility]         Results of patch tests:                         Interpretation:  - Negative                    A    = Allergic      (+) Erythema    TI   = Toxic/irritant   + E + Infiltration    RaP = Relevance at Present     ++ E/I + Papulovesicle   Rpr  = Relevance Previously     +++ E/I/P + Blister     nR   = No Relevance      [] No relevant allergic reaction observed    [x] Allergic reaction diagnosed against following allergens:     +/++  Dimethylphthalate    Interpretation/ remarks:   See later    [] Patient information given   [] ACDS CAMP information's (# ....) to following compounds: .....   [] General information's to following compounds: ......      Assessment & Plan:    ==> Final Diagnosis:     # Possible allergy to metal, cement, or other materials used in knee replacement.   - Patch testing (will need someone to read for her on Wednesday b/c lives 3 hours away)   * chronic illness with exacerbation, progression, side effects from treatment    # recurrent tongue and neck swelling (random) without breathing problems  - DDx ARB induced (Losartan) --> certainly avoid ACE inhibitors, but sometimes we see similar reactions with ARBs such as Losartan  - DDx NSAID (Cox1 Intolerance) to Aspirin, high dose Tylenol or Ibuprofen/Naproxen  - unlikely [id number] inh deficiency (was then normal)  * chronic illness with exacerbation, progression, side effects from treatment    These conclusions are made at the best of one's knowledge and belief based on the provided evidence such as patient's history and allergy test results and they can change over time or can be incomplete because of missing information's.    ==> Treatment Plan:  Monday (in person), Wednesday (photos), Friday (in person)    ___________________________    Staff: : provider    Follow-up in Derm-Allergy clinic for 2nd readings and final conclusions after 4 days   ___________________________    I spent a total of 20 minutes with Nayely Wesley during today s  visit. This time was spent discussing all the individual test results, correlating them to the clinical relevance, counseling the patient and/or coordinating care

## 2022-11-23 ENCOUNTER — VIRTUAL VISIT (OUTPATIENT)
Dept: ALLERGY | Facility: CLINIC | Age: 52
End: 2022-11-23
Payer: COMMERCIAL

## 2022-11-23 DIAGNOSIS — L23.89 ALLERGIC CONTACT DERMATITIS DUE TO OTHER AGENTS: Primary | ICD-10-CM

## 2022-11-23 PROCEDURE — 99207 PR NO CHARGE LOS: CPT | Mod: 95 | Performed by: DERMATOLOGY

## 2022-11-23 NOTE — Clinical Note
"    11/23/2022         RE: Nayely Wesley  [id number]  [facility] [address]        Dear Colleague,    Thank you for referring your patient, Nayely Wesley, to the [facility] [place]. Please see a copy of my visit note below.    [facility] Dermato-allergology Note  Virtual visit: store and forward video ([email or web address] connected), start time: 1:45pm, end time: 1:30pm, date of images: during video and in Epic media  Encounter Date: Nov 23, 2022  ____________________________________________    CC: No chief complaint on file.      HPI:  (Nov 23, 2022)  Ms. Nayely Wesley is a(n) 52 year old female who presents today as a return patient for allergy consultation  - Follow-up in Derm-Allergy clinic for 1st readings of patch tests after 2 days (virtual)  - otherwise feeling well in usual state of health    Physical exam:  General: In no acute distress, well-developed, well-nourished  Eyes: no conjunctivitis  ENT: no signs of rhinitis   Pulmonary: no wheezing or coughing  Skin:Focused examination of the skin on test sites was performed = see test results below    Earlier History and Allergy exams:  (Nov 21, 2022)  - Follow-up in Derm-Allergy clinic for patch testing    Earlier History and Allergy exams:  (Nov 2, 2022)  - In the end of 2016 had bilateral knee replacement   - Has high inflammatory markers   - When she went to have knees replaced, orthopedic surgeon said that he thought she had autoimmune disease   - Since knees replaced in 2016, both legs are burning at night almost every night   - \"Feels like something attacking me from inside out\"  - Knees have been okay   - Over the years, symmetric arthritis especially hips over the summer   - In September they went to a bbq festival and woke up at night and this night it was the worst she ever felt   - Legs feel objectively \"burning and on fire\"  - At one point left knee was swollen up like a basketball   - They adam fluid off " the leg but WBC was 50,000 so they did an emergency surgery  - status post left knee irrigation and debridement, left total knee explant, and antibiotic spacer placement preformed on 9/19/2022  - otherwise feeling well in usual state of health    Family history of lupus   > 8 miscarriages   History of polymalgia rheumatica     No eczema, no history of asthma or seasonal allergies   History of skin cancer    Never had a problem with fashion jewelery or metals   Recently has had some reaction to adhesives       Past Medical History:   Patient Active Problem List   Diagnosis     Morbid obesity (H)     No past medical history on file.    Allergies:  Allergies   Allergen Reactions     Bupropion Hives     Hydrocodone-Acetaminophen Anaphylaxis     Pt had taken Vicodin, norflex and medrol all at same time  Pt had taken Vicodin, norflex and medrol all at same time  Pt had taken Vicodin, norflex and medrol all at same time       Gadolinium Derivatives Swelling     Cat Hair Extract Other (See Comments)     Difficult breathing     No Clinical Screening - See Comments      2009 - had Medrol pack, vicodin and norflex  together for pain med treatment - had tongue swell,  face swell and SOB symptoms - has taken each separate without problem. Do not use together.      Adhesive Tape Rash     Band aids       Medications:  Current Outpatient Medications   Medication     amoxicillin (AMOXIL) 500 MG capsule     aspirin (ASA) 325 MG tablet     busPIRone (BUSPAR) 15 MG tablet     cetirizine (ZYRTEC) 10 MG tablet     cholecalciferol 50 MCG (2000 UT) CAPS     hydrOXYzine (VISTARIL) 25 MG capsule     linaclotide (LINZESS) 290 MCG capsule     losartan (COZAAR) 25 MG tablet     Magnesium 400 MG TABS     metFORMIN (GLUCOPHAGE XR) 500 MG 24 hr tablet     methocarbamol (ROBAXIN) 500 MG tablet     oxyCODONE (ROXICODONE) 5 MG tablet     POTASSIUM CHLORIDE ER PO     predniSONE (DELTASONE) 5 MG tablet     Probiotic Product (PROBIOTIC-10 PO)      senna-docusate (SENOKOT-S/PERICOLACE) 8.6-50 MG tablet     spironolactone (ALDACTONE) 25 MG tablet     torsemide (DEMADEX) 20 MG tablet     traZODone (DESYREL) 50 MG tablet     No current facility-administered medications for this visit.       Social History:  The patient worked as a senior travel planner.     Family History:  No family history on file.    Previous Labs, Allergy Tests, Dermatopathology, Imaging:  Recent BMP shows elevated Cr 1.10 with reduced GFR  WBC normal, Hgb low at 9.4   CRP 3.9 ESR >89        Referred By: No referring provider defined for this encounter.     Allergy Tests:    Past Allergy Test    Order for Future Allergy Testing:    [] Outpatient  [] Inpatient: [place]..../ Bed ....       Skin Atopy (atopic dermatitis) [] Yes   [x] No .........  Contact allergies:   [x] Yes   [] No .implant material?.  Hand eczema:   [] Yes   [x] No           Leading hand:   [] R   [] L       [] Ambidextrous         Drug allergies:        [] Yes   [x] No  which?......    Urticaria/Angioedema  [] Yes   [x] No .........  Food Allergy:  [] Yes   [x] No  which?......  Pets :  [] Yes   [x] No  which?......         []  Rhinitis   [] Conjunctivitis   [] Sinusitis   [] Polyposis   [] Otitis   [] Pharyngitis         []  Postnasal drip    [x]  none  Operations:   [] Tonsils   [] Septum   [] Sinus   [] Polyposis        [] Asthma bronchiale   [] Coughing      []  none  Symptoms (mostly Rhinoconjunctivitis and Asthma) aggravated by:  Season   [] I   [] II   [] III   [] IV   []V   []VI   []VII   []VIII   []IX   []X   []XI   []XII     [] perennial   Day time      [] morning   [] noon      [] evening        [] night    [] whole day........  []  none  Location/changes    [] inside        [] outside   [] mountains    [] sea     [] others.............   []  none  Triggers, specific     [] animals     [] plants     [] dust              [] others ...........................    []  none  Triggers, others       [] work          []  psyche    [] sport            [] others .............................  []  none  Irritant                [] phys efforts [] smoke    [] heat/cold     [] odors  []others............... []  none    Order for PATCH TESTS  Reason for tests (suspected allergy): reaction to implant material?  Known previous allergies: none  Standardized panels  [x] Standard panel (40 tests)  [x] Preservatives & Antimicrobials (31 tests)  [] Emulsifiers & Additives (25 tests)   [] Perfumes/Flavours & Plants (25 tests)  [] Hairdresser panel (12 tests)   [] Rubber Chemicals (22 tests)  [x] Plastics (26 tests)  [] Colorants/Dyes/Food additives (20 tests)  [x] Metals (implants/dental) (24 tests)  [] Local anaesthetics/NSAIDs (13 tests)  [x] Antibiotics & Antimycotics (14 tests)   [] Corticosteroids (15 tests)   [] Photopatch test (62 tests)   [] others: ...      [] Patient's own products: ...    DO NOT test if chemical or biological identity is unknown!     always ask from patient the product information and safety sheets (MSDS)       Order for PRICK TESTS    Reason for tests (suspected allergy): not necessary  Known previous allergies: n/a    Standardized prick panels  [] Atopic panel (20 tests)  [] Pediatric Panel (12 tests)  [] Milk, Meat, Eggs, Grains (20 tests)   [] Dust, Epithelia, Feathers (10 tests)  [] Fish, Seafood, Shellfish (17 tests)  [] Nuts, Beans (14 tests)  [] Spice, Vegetable, Fruit (17 tests)  [] Pollen Panel = Tree, Grass, Weed (24 tests)  [] Others: ...      [] Patient's own products: ...    DO NOT test if chemical or biological identity is unknown!     always ask from patient the product information and safety sheets (MSDS)     Standardized intradermal tests  [] Penicillium notatum [] Aspergillus fumigatus [] House dust mites D.far & D. pteron  [] Cat    [] dog  [] Others: ...  [] Bee venom   [] Wasp venom  !!Specific protocol with dilutions!!       Order for Drug allergy tests (prick & Intradermal & patch tests)    []  Penicillin G  [] Ampicillin [] Cefazolin   [] Ceftriaxone   [] Ceftazidime  [] Bactrim    [] Others: ...  Order for ... as test date    ________________________________  RESULTS & EVALUATION of PATCH TESTS    Nov 21, 2022 application of patch tests:    Patch test readings after     [x] 2 days, [] 3 days [x] 4 days, [] 5 days,  Other duration: ...    STANDARD Series                                          # Substance 2 days 4 days remarks     1 Stone Mix [C] - -       2 Colophony - -       3  2-Mercaptobenzothiazole  - -       4 Methylisothiazolinone - -       5 Carba Mix - -       6 Thiuram Mix [A] - -       7 Bisphenol A Epoxy Resin - -       8 Q-Frat-Zwvyubnergl-Formaldehyde Resin - -       9 Mercapto Mix [A] - -       10 Black Rubber Mix- PPD [B] - -       11 Potassium Dichromate  -  -       12 Balsam of [place] (Myroxylon Pereirae Resin) - -       13 Nickel Sulphate Hexahydrate - -       14 Mixed Dialkyl Thiourea - -       15 Paraben Mix [B] - -       16 Methyldibromo Glutaronitrile - -       17 Fragrance Mix - -       18 2-Bromo-2-Nitropropane-1,3-Diol (Bronopol) CT - -       19 Lyral - -       20 Tixocortol-21- Pivalate CT - -       21 Diazolidiyl Urea (Germall II) - -       22 Methyl Methacrylate - -       23 Cobalt (II) Chloride Hexahydrate - -       24 Fragrance Mix II  - -       25 Compositae Mix - -       26 Benzoyl Peroxide - -       27 Bacitracin - -       28 Formaldehyde - -       29 Methylchloroisothiazolinone / Methylisothiazolinone - -       30 Corticosteroid Mix CT - -       31 Sodium Lauryl Sulfate - -       32 Lanolin Alcohol - -       33 Turpentine - -       34 Cetylstearylalcohol - -       35 Chlorhexidine Dicluconate - -       36 Budenoside - -       37 Imidazolidinyl Urea  - -       38 Ethyl-2 Cyanoacrylate NA NA       39 Quaternium 15 (Dowicil 200) - -       40 Decyl Glucoside - -       PRESERVATIVES & ANTIMICROBIALS        # Substance 2 days 4 days remarks   41 1  1,2-Benzisothiazoline-3-One,  Sodium Salt - -     2  1,3,5-Darwin (2-Hydroxyethyl) - Hexahydrotriazine (Grotan BK) - -     3 3-Dmxvrzuiummtr-7-Nitro-1, 3-Propanediol NA NA     4  3, 4, 4' - Triclocarban - -    45 5 4 - Chloro - 3 - Cresol - -     6 4 - Chloro - 4 - Xylenol (PCMX) - -     7 7-Ethylbicyclooxazolidine (Bioban IV4068) - -     8 Benzalkonium Chloride CT - -     9 Benzyl Alcohol - -    50 10 Cetalkonium Chloride - -     11 Cetylpyrimidine Chloride  - -     12 Chloroacetamide - -     13 DMDM Hydantoin - -     14 Glutaraldehyde - -    55 15 Triclosan - -     16 Glyoxal Trimeric Dihydrate - -     17 Iodopropynyl Butylcarbamate - -     18 Octylisothiazoline - -     19 Bithionol CT - -    60 20 Bioban P 1487 (Nitrobutyl) Morpholine/(Ethylnitro-Trimethylene) Dimorpholine - -     21 Phenoxyethanol - -     22 Phenyl Salicylate - -     23 Povidone Iodine - -     24 Sodium Benzoate - -    65 25 Sodium Disulfite - -     26 Sorbic Acid - -     27 Thimerosal - -     28 Melamine Formaldehyde Resin - -     29 Ethylenediamine Dihydrochloride - -      Parabens      70 30 Butyl-P-Hydroxybenzoate - -     31 Ethyl-P-Hydroxybenzoate NA NA     32 Methyl-P-Hydroxybenzoate - -    73 33 Propyl-P-Hydroxybenzoate - -      PLASTICS        # Substance 2 days 4 days remarks     Acrylates - -    74 1 2-Hydroxyethyl Methacrylate (HEMA) - -    75 2 1,4-Butandioldimethacrylate (BUDMA) - -     3  2-Ethylhexyl Acrylate - -     4 Bisphenol-A-Dimethacrylate  - -     5 Diurethane-Dimethacrylate - -     6 Ethyleneglycoldimethacrylate (EGDMA) - -    80 7 Pentaerythritoltriacrylate (KELLI) - -     8 Triethylene Glycol Dimethacrylate (TEGDMA) - -      Synthetic material/additives        9 U-Ojsg-Iwzskivocxc - -     10 Tricresyl Phosphate - -     11 1-Kwbo-Wigstzohzqfrj - -    85 12 Bis (2-Ethylhexyl) Phthalate - -     13 Dibutylphthalate - -     14 Dimethylphthalate +/++ -     15 Toluene-2,4-Diisocyanate - -     16 Diphenylmethane-4,4''-Diisocyanate - -      EPOXY RESIN SYSTEMS         Reactive Solvents - -    90 17 Cresyl Glycidyl Ether - -     18 Butyl Glycidyl Ether - -     19 Phenyl Glycidyl Ether - -     20 1,4-Butanediol Diglycidyl Ether - -     21 1,6-Hexanediole Diglycidyl Ether - -      Hardener / Accelerator - -    95 22 Triethylenetetramine - -     23 Diethylenetriamine - -     24 Isophorone Diamine (IPD) - -    98 25 N,N-Dimethyl-P-Toluidine - -      METALS (Implants / Dental)        # Substance 2 days 4 days remarks   99 1 Ammonium Heptamolybdate (IV) - -    100 2 Ammonium Tetrachloroplatinate - -     3 Indium (III) Chloride - -     4 Iridium (III) Chloride - -     5 Ferric Chloride - -     6 Manganese (II) Chloride - -    105 7 Niobium (V) Chloride - -     8 Palladium Chloride - -     9 Silver Nitrate - -     10 Gold Sodium Thiosulfate - -     11 Tantal - -    110 12 Tin (II) Chloride - -     13 Titanium (IV) Oxide - -     14 Titanium - -     15 Tungstic Acid, Sod Salt Dihydrat - -     16 Vanadium Pentoxide - -    115 17 Wolfram - -     18 Zinc Chloride - -     19 Zirconium (IV) Oxide - -     20 Ammoniated Murcury - -     21 Copper Sulfate Pentahydrate - -    120 22 Amalgam  - -     23 Aluminum Hydroxide - -    122 24 Platinum Tetrachloride - -        ANTIBIOTICS & ANTIMYCOTICS    # Substance 2 days 4 days remarks   123 1 Erythromycine - -     2 Framycetine Sulphate - -    125 3 Fusidic Acid Sodium Salt - -     4 Gentamicin Sulphate - -     5 Neomycine Sulphate - -     6 Oxytetracycline  - -     7 Polymyxin B Sulphate - -    130 8 Tetracycline-HCL - -     9 Sulfanilamide - -     10 Metronidazole - -     11 Oxyquinoline Mix - -     12 Nitrofurazone - -      Tobramycine    T-050 Dormer   135 13 Nystatin - -     14 Clotrimazole - -    137 15 Clioquinol 5% - -      Miconazole  - - M-027 Dormer         Results of patch tests:                         Interpretation:  - Negative                    A    = Allergic      (+) Erythema    TI   = Toxic/irritant   + E + Infiltration    RaP =  Relevance at Present     ++ E/I + Papulovesicle   Rpr  = Relevance Previously     +++ E/I/P + Blister     nR   = No Relevance      [] No relevant allergic reaction observed    [x] Allergic reaction diagnosed against following allergens:     +/++ Dimethylphthalate    Interpretation/ remarks:   See later    [] Patient information given   [] ACDS CAMP information's (# ....) to following compounds: .....   [] General information's to following compounds: ......      Assessment & Plan:    ==> Final Diagnosis:     # Possible allergy to metal, cement, or other materials used in knee replacement.   - Patch testing (will need someone to read for her on Wednesday b/c lives 3 hours away)   * chronic illness with exacerbation, progression, side effects from treatment    # recurrent tongue and neck swelling (random) without breathing problems  - DDx ARB induced (Losartan) --> certainly avoid ACE inhibitors, but sometimes we see similar reactions with ARBs such as Losartan  - DDx NSAID (Cox1 Intolerance) to Aspirin, high dose Tylenol or Ibuprofen/Naproxen  - unlikely [id number] inh deficiency (was then normal)  * chronic illness with exacerbation, progression, side effects from treatment    These conclusions are made at the best of one's knowledge and belief based on the provided evidence such as patient's history and allergy test results and they can change over time or can be incomplete because of missing information's.    ==> Treatment Plan:  Monday (in person), Wednesday (photos), Friday (in person)    ___________________________    {Critical access hospitalvolved:[id number]}: provider    Follow-up in Derm-Allergy clinic for 2nd readings and final conclusions after 4 days   ___________________________    I spent a total of *** minutes with Nayely Wesley during today s  visit. This time was spent discussing all the individual test results, correlating them to the clinical relevance, counseling the patient and/or coordinating care and performing  allergy tests or procedures.           Again, thank you for allowing me to participate in the care of your patient.        Sincerely,        Royce De Leon MD

## 2022-11-24 NOTE — PROGRESS NOTES
"[facility] Dermato-allergology Note  Office visit  Encounter Date: Nov 25, 2022  ____________________________________________    CC: No chief complaint on file.      HPI:  (Nov 25, 2022)  Ms. Nayely Wesley is a(n) 52 year old female who presents today as a return patient for allergy consultation  - Follow-up in Derm-Allergy clinic for 2nd readings and final conclusions after 4 days   - otherwise feeling well in usual state of health    Physical exam:  General: In no acute distress, well-developed, well-nourished  Eyes: no conjunctivitis  ENT: no signs of rhinitis   Pulmonary: no wheezing or coughing  Skin:Focused examination of the skin on test sites was performed = see test results below    Earlier History and Allergy exams:  (Nov 23, 2022)  - Follow-up in Derm-Allergy clinic for 1st readings of patch tests after 2 days (virtual)    Earlier History and Allergy exams:  (Nov 21, 2022)  - Follow-up in Derm-Allergy clinic for patch testing    Earlier History and Allergy exams:  (Nov 2, 2022)  - In the end of 2016 had bilateral knee replacement   - Has high inflammatory markers   - When she went to have knees replaced, orthopedic surgeon said that he thought she had autoimmune disease   - Since knees replaced in 2016, both legs are burning at night almost every night   - \"Feels like something attacking me from inside out\"  - Knees have been okay   - Over the years, symmetric arthritis especially hips over the summer   - In [place] [employer] [place] and woke up at night and this night it was the worst she ever felt   - Legs feel objectively \"burning and on fire\"  - At one point left knee was swollen up like a basketball   - They adam fluid off the leg but WBC was 50,000 so they did an emergency surgery  - status post left knee irrigation and debridement, left total knee explant, and antibiotic spacer placement preformed on 9/19/2022  - otherwise feeling well in usual state of " health    Family history of lupus   > 8 miscarriages   History of polymalgia rheumatica     No eczema, no history of asthma or seasonal allergies   History of skin cancer    Never had a problem with fashion jewelery or metals   Recently has had some reaction to adhesives       Past Medical History:   Patient Active Problem List   Diagnosis     Morbid obesity (H)     No past medical history on file.    Allergies:  Allergies   Allergen Reactions     Bupropion Hives     Hydrocodone-Acetaminophen Anaphylaxis     Pt had taken Vicodin, norflex and medrol all at same time  Pt had taken Vicodin, norflex and medrol all at same time  Pt had taken Vicodin, norflex and medrol all at same time       Gadolinium Derivatives Swelling     Cat Hair Extract Other (See Comments)     Difficult breathing     No Clinical Screening - See Comments      2009 - had Medrol pack, vicodin and norflex  together for pain med treatment - had tongue swell,  face swell and SOB symptoms - has taken each separate without problem. Do not use together.      Adhesive Tape Rash     Band aids       Medications:  Current Outpatient Medications   Medication     amoxicillin (AMOXIL) 500 MG capsule     aspirin (ASA) 325 MG tablet     busPIRone (BUSPAR) 15 MG tablet     cetirizine (ZYRTEC) 10 MG tablet     cholecalciferol 50 MCG (2000 UT) CAPS     hydrOXYzine (VISTARIL) 25 MG capsule     linaclotide (LINZESS) 290 MCG capsule     losartan (COZAAR) 25 MG tablet     Magnesium 400 MG TABS     metFORMIN (GLUCOPHAGE XR) 500 MG 24 hr tablet     methocarbamol (ROBAXIN) 500 MG tablet     oxyCODONE (ROXICODONE) 5 MG tablet     POTASSIUM CHLORIDE ER PO     predniSONE (DELTASONE) 5 MG tablet     Probiotic Product (PROBIOTIC-10 PO)     senna-docusate (SENOKOT-S/PERICOLACE) 8.6-50 MG tablet     spironolactone (ALDACTONE) 25 MG tablet     torsemide (DEMADEX) 20 MG tablet     traZODone (DESYREL) 50 MG tablet     No current facility-administered medications for this visit.        Social History:  The patient worked as a senior travel planner.     Family History:  No family history on file.    Previous Labs, Allergy Tests, Dermatopathology, Imaging:  Recent BMP shows elevated Cr 1.10 with reduced GFR  WBC normal, Hgb low at 9.4   CRP 3.9 ESR >89        Referred By: No referring provider defined for this encounter.     Allergy Tests:    Past Allergy Test    Order for Future Allergy Testing:    [] Outpatient  [] Inpatient: [place]..../ Bed ....       Skin Atopy (atopic dermatitis) [] Yes   [x] No .........  Contact allergies:   [x] Yes   [] No .implant material?.  Hand eczema:   [] Yes   [x] No           Leading hand:   [] R   [] L       [] Ambidextrous         Drug allergies:        [] Yes   [x] No  which?......    Urticaria/Angioedema  [] Yes   [x] No .........  Food Allergy:  [] Yes   [x] No  which?......  Pets :  [] Yes   [x] No  which?......         []  Rhinitis   [] Conjunctivitis   [] Sinusitis   [] Polyposis   [] Otitis   [] Pharyngitis         []  Postnasal drip    [x]  none  Operations:   [] Tonsils   [] Septum   [] Sinus   [] Polyposis        [] Asthma bronchiale   [] Coughing      []  none  Symptoms (mostly Rhinoconjunctivitis and Asthma) aggravated by:  Season   [] I   [] II   [] III   [] IV   []V   []VI   []VII   []VIII   []IX   []X   []XI   []XII     [] perennial   Day time      [] morning   [] noon      [] evening        [] night    [] whole day........  []  none  Location/changes    [] inside        [] outside   [] mountains    [] sea     [] others.............   []  none  Triggers, specific     [] animals     [] plants     [] dust              [] others ...........................    []  none  Triggers, others       [] work          [] psyche    [] sport            [] others .............................  []  none  Irritant                [] phys efforts [] smoke    [] heat/cold     [] odors  []others............... []  none    Order for PATCH TESTS  Reason for tests  (suspected allergy): reaction to implant material?  Known previous allergies: none  Standardized panels  [x] Standard panel (40 tests)  [x] Preservatives & Antimicrobials (31 tests)  [] Emulsifiers & Additives (25 tests)   [] Perfumes/Flavours & Plants (25 tests)  [] Hairdresser panel (12 tests)   [] Rubber Chemicals (22 tests)  [x] Plastics (26 tests)  [] Colorants/Dyes/Food additives (20 tests)  [x] Metals (implants/dental) (24 tests)  [] Local anaesthetics/NSAIDs (13 tests)  [x] Antibiotics & Antimycotics (14 tests)   [] Corticosteroids (15 tests)   [] Photopatch test (62 tests)   [] others: ...      [] Patient's own products: ...    DO NOT test if chemical or biological identity is unknown!     always ask from patient the product information and safety sheets (MSDS)       Order for PRICK TESTS    Reason for tests (suspected allergy): not necessary  Known previous allergies: n/a    Standardized prick panels  [] Atopic panel (20 tests)  [] Pediatric Panel (12 tests)  [] Milk, Meat, Eggs, Grains (20 tests)   [] Dust, Epithelia, Feathers (10 tests)  [] Fish, Seafood, Shellfish (17 tests)  [] Nuts, Beans (14 tests)  [] Spice, Vegetable, Fruit (17 tests)  [] Pollen Panel = Tree, Grass, Weed (24 tests)  [] Others: ...      [] Patient's own products: ...    DO NOT test if chemical or biological identity is unknown!     always ask from patient the product information and safety sheets (MSDS)     Standardized intradermal tests  [] Penicillium notatum [] Aspergillus fumigatus [] House dust mites D.far & D. pteron  [] Cat    [] dog  [] Others: ...  [] Bee venom   [] Wasp venom  !!Specific protocol with dilutions!!       Order for Drug allergy tests (prick & Intradermal & patch tests)    [] Penicillin G  [] Ampicillin [] Cefazolin   [] Ceftriaxone   [] Ceftazidime  [] Bactrim    [] Others: ...  Order for ... as test date    ________________________________  RESULTS & EVALUATION of PATCH TESTS    Nov 21, 2022 application of  patch tests:    Patch test readings after     [x] 2 days, [] 3 days [x] 4 days, [] 5 days,  Other duration: ...    STANDARD Series                                          # Substance 2 days 4 days remarks     1 Stone Mix [C] - -       2 Colophony - -       3  2-Mercaptobenzothiazole  - -       4 Methylisothiazolinone - -       5 Carba Mix - -       6 Thiuram Mix [A] - -       7 Bisphenol A Epoxy Resin - -       8 M-Tamq-Mxolsaqxyqe-Formaldehyde Resin - -       9 Mercapto Mix [A] - -       10 Black Rubber Mix- PPD [B] - -       11 Potassium Dichromate  -  -       12 Balsam of [place] (Myroxylon Pereirae Resin) - -       13 Nickel Sulphate Hexahydrate - -       14 Mixed Dialkyl Thiourea - -       15 Paraben Mix [B] - -       16 Methyldibromo Glutaronitrile - -       17 Fragrance Mix - -       18 2-Bromo-2-Nitropropane-1,3-Diol (Bronopol) CT - -       19 Lyral - -       20 Tixocortol-21- Pivalate CT - -       21 Diazolidiyl Urea (Germall II) - -       22 Methyl Methacrylate - -       23 Cobalt (II) Chloride Hexahydrate - -       24 Fragrance Mix II  - -       25 Compositae Mix - -       26 Benzoyl Peroxide - -       27 Bacitracin - -       28 Formaldehyde - -       29 Methylchloroisothiazolinone / Methylisothiazolinone - -       30 Corticosteroid Mix CT - -       31 Sodium Lauryl Sulfate - -       32 Lanolin Alcohol - -       33 Turpentine - -       34 Cetylstearylalcohol - -       35 Chlorhexidine Dicluconate - -       36 Budenoside - -       37 Imidazolidinyl Urea  - -       38 Ethyl-2 Cyanoacrylate NA NA       39 Quaternium 15 (Dowicil 200) - -       40 Decyl Glucoside - -       PRESERVATIVES & ANTIMICROBIALS        # Substance 2 days 4 days remarks   41 1  1,2-Benzisothiazoline-3-One, Sodium Salt - -     2  1,3,5-Darwin (2-Hydroxyethyl) - Hexahydrotriazine (Grotan BK) - -     3 1-Zvxnvqzpjpgos-8-Nitro-1, 3-Propanediol NA NA     4  3, 4, 4' - Triclocarban - -    45 5 4 - Chloro - 3 - Cresol - -     6 4 - Chloro - 4 -  Xylenol (PCMX) - -     7 7-Ethylbicyclooxazolidine (Bioban AO6830) - -     8 Benzalkonium Chloride CT - -     9 Benzyl Alcohol - -    50 10 Cetalkonium Chloride - -     11 Cetylpyrimidine Chloride  - -     12 Chloroacetamide - -     13 DMDM Hydantoin - -     14 Glutaraldehyde - -    55 15 Triclosan - -     16 Glyoxal Trimeric Dihydrate - -     17 Iodopropynyl Butylcarbamate - -     18 Octylisothiazoline - -     19 Bithionol CT - -    60 20 Bioban P 1487 (Nitrobutyl) Morpholine/(Ethylnitro-Trimethylene) Dimorpholine - -     21 Phenoxyethanol - -     22 Phenyl Salicylate - -     23 Povidone Iodine - -     24 Sodium Benzoate - -    65 25 Sodium Disulfite - -     26 Sorbic Acid - -     27 Thimerosal - -     28 Melamine Formaldehyde Resin - -     29 Ethylenediamine Dihydrochloride - -      Parabens      70 30 Butyl-P-Hydroxybenzoate - -     31 Ethyl-P-Hydroxybenzoate NA NA     32 Methyl-P-Hydroxybenzoate - -    73 33 Propyl-P-Hydroxybenzoate - -      PLASTICS        # Substance 2 days 4 days remarks     Acrylates - -    74 1 2-Hydroxyethyl Methacrylate (HEMA) - -    75 2 1,4-Butandioldimethacrylate (BUDMA) - -     3  2-Ethylhexyl Acrylate - -     4 Bisphenol-A-Dimethacrylate  - -     5 Diurethane-Dimethacrylate - -     6 Ethyleneglycoldimethacrylate (EGDMA) - -    80 7 Pentaerythritoltriacrylate (KELLI) - -     8 Triethylene Glycol Dimethacrylate (TEGDMA) - -      Synthetic material/additives        9 K-Ucgv-Bsrnsenhghe - -     10 Tricresyl Phosphate - -     11 2-Bgsg-Mglnpwkohubxx - -    85 12 Bis (2-Ethylhexyl) Phthalate - -     13 Dibutylphthalate - -     14 Dimethylphthalate +/++ ++     15 Toluene-2,4-Diisocyanate - -     16 Diphenylmethane-4,4''-Diisocyanate - -      EPOXY RESIN SYSTEMS        Reactive Solvents - -    90 17 Cresyl Glycidyl Ether - -     18 Butyl Glycidyl Ether - -     19 Phenyl Glycidyl Ether - -     20 1,4-Butanediol Diglycidyl Ether - -     21 1,6-Hexanediole Diglycidyl Ether - -      Hardener /  Accelerator - -    95 22 Triethylenetetramine - -     23 Diethylenetriamine - -     24 Isophorone Diamine (IPD) - -    98 25 N,N-Dimethyl-P-Toluidine - -      METALS (Implants / Dental)        # Substance 2 days 4 days remarks   99 1 Ammonium Heptamolybdate (IV) - -    100 2 Ammonium Tetrachloroplatinate - -     3 Indium (III) Chloride - -     4 Iridium (III) Chloride - -     5 Ferric Chloride - -     6 Manganese (II) Chloride - -    105 7 Niobium (V) Chloride - -     8 Palladium Chloride - -     9 Silver Nitrate - -     10 Gold Sodium Thiosulfate - -     11 Tantal - -    110 12 Tin (II) Chloride - -     13 Titanium (IV) Oxide - -     14 Titanium - -     15 Tungstic Acid, Sod Salt Dihydrat - -     16 Vanadium Pentoxide - -    115 17 Wolfram - -     18 Zinc Chloride - -     19 Zirconium (IV) Oxide - -     20 Ammoniated Murcury - -     21 Copper Sulfate Pentahydrate - -    120 22 Amalgam  - -     23 Aluminum Hydroxide - -    122 24 Platinum Tetrachloride - -        ANTIBIOTICS & ANTIMYCOTICS    # Substance 2 days 4 days remarks   123 1 Erythromycine - -     2 Framycetine Sulphate - -    125 3 Fusidic Acid Sodium Salt - -     4 Gentamicin Sulphate - -     5 Neomycine Sulphate - -     6 Oxytetracycline  - -     7 Polymyxin B Sulphate - -    130 8 Tetracycline-HCL - -     9 Sulfanilamide - -     10 Metronidazole - -     11 Oxyquinoline Mix - -     12 Nitrofurazone - -      Tobramycine    T-050 Dorm   135 13 Nystatin - -     14 Clotrimazole - -    137 15 Clioquinol 5% - -      Miconazole  - - M-[id number] [facility]         Results of patch tests:                         Interpretation:  - Negative                    A    = Allergic      (+) Erythema    TI   = Toxic/irritant   + E + Infiltration    RaP = Relevance at Present     ++ E/I + Papulovesicle   Rpr  = Relevance Previously     +++ E/I/P + Blister     nR   = No Relevance      [] No relevant allergic reaction observed    [x] Allergic reaction diagnosed against following  allergens:   ++ Dimethylphthalate    Interpretation/ remarks:   Possible delayed type reaction in knee to phthalates, but not sure.     [] Patient information given   [] ACDS CAMP information's (# ....) to following compounds: .....   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   >> have to find out if in this knee implant on the left knee phathalate contaminations could be possible (maybe find out the differences in plastics in left and right knee implant --> right one doesn't has a problem)  >> how about abrasive wear of the plastic parts and 2nd allergic reaction to the contaminants with phthalates? Is there any indication that in the tissue around the inflamed knee implant are Eosinophils? This could indicate an allergy.  >> allergies to Dimethylphthalates are very rare and it is indeed suspicious that patient has ONLY allergy to this plastic additive  * chronic illness with exacerbation, progression, side effects from treatment    # recurrent tongue and neck swelling (random) without breathing problems  - DDx ARB induced (Losartan) --> certainly avoid ACE inhibitors, but sometimes we see similar reactions with ARBs such as Losartan  - DDx NSAID (Cox1 Intolerance) to Aspirin, high dose Tylenol or Ibuprofen/Naproxen  - unlikely W0Hptppavx inh deficiency (was then normal)  * chronic illness with exacerbation, progression, side effects from treatment    These conclusions are made at the best of one's knowledge and belief based on the provided evidence such as patient's history and allergy test results and they can change over time or can be incomplete because of missing information's.    ==> Treatment Plan:  >> write Email to Dr. ALEXANDER Patiño, orthopedic surgeon ==> would be good to get some material from inflamed area on left knee to see if any Eosinophils in  there and find out if Phthalates are used in the implants    Reply from orthopedic surgery:  Nathalie Daley, BRADLEY sent to Royce De Leon MD; Axel Honeycutt MD; Anna Marie Ambriz RN Hello     I received information on the spacer used for this surgery. I asked the supplier to reach out to the . They received confirmation from the , [facility], that the [employer] product, [id number], that was used for her procedure in 9/2022 does not contain Dimethylphthalate.     I have not heard back about the cement but have reached out again so hopefully will be hearing back from them soon.       ___________________________    Staff: : provider    Follow-up in Derm-Allergy clinic if necessary  ___________________________    I spent a total of 35 minutes with Nayely Wesley during today s  visit. This time was spent discussing all the individual test results, correlating them to the clinical relevance, counseling the patient and/or coordinating care

## 2022-11-25 ENCOUNTER — OFFICE VISIT (OUTPATIENT)
Dept: ALLERGY | Facility: CLINIC | Age: 52
End: 2022-11-25
Payer: COMMERCIAL

## 2022-11-25 DIAGNOSIS — L23.89 ALLERGIC CONTACT DERMATITIS DUE TO OTHER AGENTS: Primary | ICD-10-CM

## 2022-11-25 PROCEDURE — 99214 OFFICE O/P EST MOD 30 MIN: CPT | Performed by: DERMATOLOGY

## 2022-11-25 NOTE — PROGRESS NOTES
Dermatology Rooming Note    Nayely Wesley's goals for this visit include:   Chief Complaint   Patient presents with     SHELLIE Adler is here today for a patch test day 5      QASIM Decker

## 2022-11-25 NOTE — LETTER
"    11/25/2022         RE: Nayely Wesley  [id number]  [facility] [address]        Dear Colleague,    Thank you for referring your patient, Nayely Wesley, to the [facility] [place]. Please see a copy of my visit note below.    [facility] Dermato-allergology Note  Office visit  Encounter Date: Nov 25, 2022  ____________________________________________    CC: No chief complaint on file.      HPI:  (Nov 25, 2022)  Ms. Nayely Wesley is a(n) 52 year old female who presents today as a return patient for allergy consultation  - Follow-up in Derm-Allergy clinic for 2nd readings and final conclusions after 4 days   - otherwise feeling well in usual state of health    Physical exam:  General: In no acute distress, well-developed, well-nourished  Eyes: no conjunctivitis  ENT: no signs of rhinitis   Pulmonary: no wheezing or coughing  Skin:Focused examination of the skin on test sites was performed = see test results below    Earlier History and Allergy exams:  (Nov 23, 2022)  - Follow-up in Derm-Allergy clinic for 1st readings of patch tests after 2 days (virtual)    Earlier History and Allergy exams:  (Nov 21, 2022)  - Follow-up in Derm-Allergy clinic for patch testing    Earlier History and Allergy exams:  (Nov 2, 2022)  - In the end of 2016 had bilateral knee replacement   - Has high inflammatory markers   - When she went to have knees replaced, orthopedic surgeon said that he thought she had autoimmune disease   - Since knees replaced in 2016, both legs are burning at night almost every night   - \"Feels like something attacking me from inside out\"  - Knees have been okay   - Over the years, symmetric arthritis especially hips over the summer   - In September they went to a bbq festival and woke up at night and this night it was the worst she ever felt   - Legs feel objectively \"burning and on fire\"  - At one point left knee was swollen up like a basketball   - They adam " fluid off the leg but WBC was 50,000 so they did an emergency surgery  - status post left knee irrigation and debridement, left total knee explant, and antibiotic spacer placement preformed on 9/19/2022  - otherwise feeling well in usual state of health    Family history of lupus   > 8 miscarriages   History of polymalgia rheumatica     No eczema, no history of asthma or seasonal allergies   History of skin cancer    Never had a problem with fashion jewelery or metals   Recently has had some reaction to adhesives       Past Medical History:   Patient Active Problem List   Diagnosis     Morbid obesity (H)     No past medical history on file.    Allergies:  Allergies   Allergen Reactions     Bupropion Hives     Hydrocodone-Acetaminophen Anaphylaxis     Pt had taken Vicodin, norflex and medrol all at same time  Pt had taken Vicodin, norflex and medrol all at same time  Pt had taken Vicodin, norflex and medrol all at same time       Gadolinium Derivatives Swelling     Cat Hair Extract Other (See Comments)     Difficult breathing     No Clinical Screening - See Comments      2009 - had Medrol pack, vicodin and norflex  together for pain med treatment - had tongue swell,  face swell and SOB symptoms - has taken each separate without problem. Do not use together.      Adhesive Tape Rash     Band aids       Medications:  Current Outpatient Medications   Medication     amoxicillin (AMOXIL) 500 MG capsule     aspirin (ASA) 325 MG tablet     busPIRone (BUSPAR) 15 MG tablet     cetirizine (ZYRTEC) 10 MG tablet     cholecalciferol 50 MCG (2000 UT) CAPS     hydrOXYzine (VISTARIL) 25 MG capsule     linaclotide (LINZESS) 290 MCG capsule     losartan (COZAAR) 25 MG tablet     Magnesium 400 MG TABS     metFORMIN (GLUCOPHAGE XR) 500 MG 24 hr tablet     methocarbamol (ROBAXIN) 500 MG tablet     oxyCODONE (ROXICODONE) 5 MG tablet     POTASSIUM CHLORIDE ER PO     predniSONE (DELTASONE) 5 MG tablet     Probiotic Product (PROBIOTIC-10 PO)      senna-docusate (SENOKOT-S/PERICOLACE) 8.6-50 MG tablet     spironolactone (ALDACTONE) 25 MG tablet     torsemide (DEMADEX) 20 MG tablet     traZODone (DESYREL) 50 MG tablet     No current facility-administered medications for this visit.       Social History:  The patient worked as a senior travel planner.     Family History:  No family history on file.    Previous Labs, Allergy Tests, Dermatopathology, Imaging:  Recent BMP shows elevated Cr 1.10 with reduced GFR  WBC normal, Hgb low at 9.4   CRP 3.9 ESR >89        Referred By: No referring provider defined for this encounter.     Allergy Tests:    Past Allergy Test    Order for Future Allergy Testing:    [] Outpatient  [] Inpatient: [place]..../ Bed ....       Skin Atopy (atopic dermatitis) [] Yes   [x] No .........  Contact allergies:   [x] Yes   [] No .implant material?.  Hand eczema:   [] Yes   [x] No           Leading hand:   [] R   [] L       [] Ambidextrous         Drug allergies:        [] Yes   [x] No  which?......    Urticaria/Angioedema  [] Yes   [x] No .........  Food Allergy:  [] Yes   [x] No  which?......  Pets :  [] Yes   [x] No  which?......         []  Rhinitis   [] Conjunctivitis   [] Sinusitis   [] Polyposis   [] Otitis   [] Pharyngitis         []  Postnasal drip    [x]  none  Operations:   [] Tonsils   [] Septum   [] Sinus   [] Polyposis        [] Asthma bronchiale   [] Coughing      []  none  Symptoms (mostly Rhinoconjunctivitis and Asthma) aggravated by:  Season   [] I   [] II   [] III   [] IV   []V   []VI   []VII   []VIII   []IX   []X   []XI   []XII     [] perennial   Day time      [] morning   [] noon      [] evening        [] night    [] whole day........  []  none  Location/changes    [] inside        [] outside   [] mountains    [] sea     [] others.............   []  none  Triggers, specific     [] animals     [] plants     [] dust              [] others ...........................    []  none  Triggers, others       [] work          []  psyche    [] sport            [] others .............................  []  none  Irritant                [] phys efforts [] smoke    [] heat/cold     [] odors  []others............... []  none    Order for PATCH TESTS  Reason for tests (suspected allergy): reaction to implant material?  Known previous allergies: none  Standardized panels  [x] Standard panel (40 tests)  [x] Preservatives & Antimicrobials (31 tests)  [] Emulsifiers & Additives (25 tests)   [] Perfumes/Flavours & Plants (25 tests)  [] Hairdresser panel (12 tests)   [] Rubber Chemicals (22 tests)  [x] Plastics (26 tests)  [] Colorants/Dyes/Food additives (20 tests)  [x] Metals (implants/dental) (24 tests)  [] Local anaesthetics/NSAIDs (13 tests)  [x] Antibiotics & Antimycotics (14 tests)   [] Corticosteroids (15 tests)   [] Photopatch test (62 tests)   [] others: ...      [] Patient's own products: ...    DO NOT test if chemical or biological identity is unknown!     always ask from patient the product information and safety sheets (MSDS)       Order for PRICK TESTS    Reason for tests (suspected allergy): not necessary  Known previous allergies: n/a    Standardized prick panels  [] Atopic panel (20 tests)  [] Pediatric Panel (12 tests)  [] Milk, Meat, Eggs, Grains (20 tests)   [] Dust, Epithelia, Feathers (10 tests)  [] Fish, Seafood, Shellfish (17 tests)  [] Nuts, Beans (14 tests)  [] Spice, Vegetable, Fruit (17 tests)  [] Pollen Panel = Tree, Grass, Weed (24 tests)  [] Others: ...      [] Patient's own products: ...    DO NOT test if chemical or biological identity is unknown!     always ask from patient the product information and safety sheets (MSDS)     Standardized intradermal tests  [] Penicillium notatum [] Aspergillus fumigatus [] House dust mites D.far & D. pteron  [] Cat    [] dog  [] Others: ...  [] Bee venom   [] Wasp venom  !!Specific protocol with dilutions!!       Order for Drug allergy tests (prick & Intradermal & patch tests)    []  Penicillin G  [] Ampicillin [] Cefazolin   [] Ceftriaxone   [] Ceftazidime  [] Bactrim    [] Others: ...  Order for ... as test date    ________________________________  RESULTS & EVALUATION of PATCH TESTS    Nov 21, 2022 application of patch tests:    Patch test readings after     [x] 2 days, [] 3 days [x] 4 days, [] 5 days,  Other duration: ...    STANDARD Series                                          # Substance 2 days 4 days remarks     1 Stone Mix [C] - -       2 Colophony - -       3  2-Mercaptobenzothiazole  - -       4 Methylisothiazolinone - -       5 Carba Mix - -       6 Thiuram Mix [A] - -       7 Bisphenol A Epoxy Resin - -       8 J-Oebr-Hbxmdydgqdh-Formaldehyde Resin - -       9 Mercapto Mix [A] - -       10 Black Rubber Mix- PPD [B] - -       11 Potassium Dichromate  -  -       12 Balsam of [place] (Myroxylon Pereirae Resin) - -       13 Nickel Sulphate Hexahydrate - -       14 Mixed Dialkyl Thiourea - -       15 Paraben Mix [B] - -       16 Methyldibromo Glutaronitrile - -       17 Fragrance Mix - -       18 2-Bromo-2-Nitropropane-1,3-Diol (Bronopol) CT - -       19 Lyral - -       20 Tixocortol-21- Pivalate CT - -       21 Diazolidiyl Urea (Germall II) - -       22 Methyl Methacrylate - -       23 Cobalt (II) Chloride Hexahydrate - -       24 Fragrance Mix II  - -       25 Compositae Mix - -       26 Benzoyl Peroxide - -       27 Bacitracin - -       28 Formaldehyde - -       29 Methylchloroisothiazolinone / Methylisothiazolinone - -       30 Corticosteroid Mix CT - -       31 Sodium Lauryl Sulfate - -       32 Lanolin Alcohol - -       33 Turpentine - -       34 Cetylstearylalcohol - -       35 Chlorhexidine Dicluconate - -       36 Budenoside - -       37 Imidazolidinyl Urea  - -       38 Ethyl-2 Cyanoacrylate NA NA       39 Quaternium 15 (Dowicil 200) - -       40 Decyl Glucoside - -       PRESERVATIVES & ANTIMICROBIALS        # Substance 2 days 4 days remarks   41 1  1,2-Benzisothiazoline-3-One,  Sodium Salt - -     2  1,3,5-Darwin (2-Hydroxyethyl) - Hexahydrotriazine (Grotan BK) - -     3 7-Dsukbaqzoqivg-4-Nitro-1, 3-Propanediol NA NA     4  3, 4, 4' - Triclocarban - -    45 5 4 - Chloro - 3 - Cresol - -     6 4 - Chloro - 4 - Xylenol (PCMX) - -     7 7-Ethylbicyclooxazolidine (Bioban NU1430) - -     8 Benzalkonium Chloride CT - -     9 Benzyl Alcohol - -    50 10 Cetalkonium Chloride - -     11 Cetylpyrimidine Chloride  - -     12 Chloroacetamide - -     13 DMDM Hydantoin - -     14 Glutaraldehyde - -    55 15 Triclosan - -     16 Glyoxal Trimeric Dihydrate - -     17 Iodopropynyl Butylcarbamate - -     18 Octylisothiazoline - -     19 Bithionol CT - -    60 20 Bioban P 1487 (Nitrobutyl) Morpholine/(Ethylnitro-Trimethylene) Dimorpholine - -     21 Phenoxyethanol - -     22 Phenyl Salicylate - -     23 Povidone Iodine - -     24 Sodium Benzoate - -    65 25 Sodium Disulfite - -     26 Sorbic Acid - -     27 Thimerosal - -     28 Melamine Formaldehyde Resin - -     29 Ethylenediamine Dihydrochloride - -      Parabens      70 30 Butyl-P-Hydroxybenzoate - -     31 Ethyl-P-Hydroxybenzoate NA NA     32 Methyl-P-Hydroxybenzoate - -    73 33 Propyl-P-Hydroxybenzoate - -      PLASTICS        # Substance 2 days 4 days remarks     Acrylates - -    74 1 2-Hydroxyethyl Methacrylate (HEMA) - -    75 2 1,4-Butandioldimethacrylate (BUDMA) - -     3  2-Ethylhexyl Acrylate - -     4 Bisphenol-A-Dimethacrylate  - -     5 Diurethane-Dimethacrylate - -     6 Ethyleneglycoldimethacrylate (EGDMA) - -    80 7 Pentaerythritoltriacrylate (KELLI) - -     8 Triethylene Glycol Dimethacrylate (TEGDMA) - -      Synthetic material/additives        9 Y-Ygpd-Rolgslemsnl - -     10 Tricresyl Phosphate - -     11 2-Zgll-Rszwzupjyienl - -    85 12 Bis (2-Ethylhexyl) Phthalate - -     13 Dibutylphthalate - -     14 Dimethylphthalate +/++ ++     15 Toluene-2,4-Diisocyanate - -     16 Diphenylmethane-4,4''-Diisocyanate - -      EPOXY RESIN SYSTEMS         Reactive Solvents - -    90 17 Cresyl Glycidyl Ether - -     18 Butyl Glycidyl Ether - -     19 Phenyl Glycidyl Ether - -     20 1,4-Butanediol Diglycidyl Ether - -     21 1,6-Hexanediole Diglycidyl Ether - -      Hardener / Accelerator - -    95 22 Triethylenetetramine - -     23 Diethylenetriamine - -     24 Isophorone Diamine (IPD) - -    98 25 N,N-Dimethyl-P-Toluidine - -      METALS (Implants / Dental)        # Substance 2 days 4 days remarks   99 1 Ammonium Heptamolybdate (IV) - -    100 2 Ammonium Tetrachloroplatinate - -     3 Indium (III) Chloride - -     4 Iridium (III) Chloride - -     5 Ferric Chloride - -     6 Manganese (II) Chloride - -    105 7 Niobium (V) Chloride - -     8 Palladium Chloride - -     9 Silver Nitrate - -     10 Gold Sodium Thiosulfate - -     11 Tantal - -    110 12 Tin (II) Chloride - -     13 Titanium (IV) Oxide - -     14 Titanium - -     15 Tungstic Acid, Sod Salt Dihydrat - -     16 Vanadium Pentoxide - -    115 17 Wolfram - -     18 Zinc Chloride - -     19 Zirconium (IV) Oxide - -     20 Ammoniated Murcury - -     21 Copper Sulfate Pentahydrate - -    120 22 Amalgam  - -     23 Aluminum Hydroxide - -    122 24 Platinum Tetrachloride - -        ANTIBIOTICS & ANTIMYCOTICS    # Substance 2 days 4 days remarks   123 1 Erythromycine - -     2 Framycetine Sulphate - -    125 3 Fusidic Acid Sodium Salt - -     4 Gentamicin Sulphate - -     5 Neomycine Sulphate - -     6 Oxytetracycline  - -     7 Polymyxin B Sulphate - -    130 8 Tetracycline-HCL - -     9 Sulfanilamide - -     10 Metronidazole - -     11 Oxyquinoline Mix - -     12 Nitrofurazone - -      Tobramycine    T-050 Dormer   135 13 Nystatin - -     14 Clotrimazole - -    137 15 Clioquinol 5% - -      Miconazole  - - M-027 Dormer         Results of patch tests:                         Interpretation:  - Negative                    A    = Allergic      (+) Erythema    TI   = Toxic/irritant   + E + Infiltration    RaP =  Relevance at Present     ++ E/I + Papulovesicle   Rpr  = Relevance Previously     +++ E/I/P + Blister     nR   = No Relevance      [] No relevant allergic reaction observed    [x] Allergic reaction diagnosed against following allergens:   ++ Dimethylphthalate    Interpretation/ remarks:   Possible delayed type reaction in knee to phthalates, but not sure.     [] Patient information given   [] ACDS CAMP information's (# ....) to following compounds: .....   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   >> have to find out if in this knee implant on the left knee phathalate contaminations could be possible (maybe find out the differences in plastics in left and right knee implant --> right one doesn't has a problem)  >> how about abrasive wear of the plastic parts and 2nd allergic reaction to the contaminants with phthalates? Is there any indication that in the tissue around the inflamed knee implant are Eosinophils? This could indicate an allergy.  >> allergies to Dimethylphthalates are very rare and it is indeed suspicious that patient has ONLY allergy to this plastic additive  * chronic illness with exacerbation, progression, side effects from treatment    # recurrent tongue and neck swelling (random) without breathing problems  - DDx ARB induced (Losartan) --> certainly avoid ACE inhibitors, but sometimes we see similar reactions with ARBs such as Losartan  - DDx NSAID (Cox1 Intolerance) to Aspirin, high dose Tylenol or Ibuprofen/Naproxen  - unlikely A2Qbfgkrwz inh deficiency (was then normal)  * chronic illness with exacerbation, progression, side effects from treatment    These conclusions are made at the best of one's knowledge and belief based on the provided evidence such as patient's history and allergy test results and they can change over time or can  be incomplete because of missing information's.    ==> Treatment Plan:  >> write Email to Dr. ALEXANDER Patiño, orthopedic surgeon ==> would be good to get some material from inflamed area on left knee to see if any Eosinophils in there and find out if Phthalates are used in the implants    ___________________________    Staff: : provider    Follow-up in Derm-Allergy clinic if necessary  ___________________________    I spent a total of 35 minutes with Nayely Wesley during today s  visit. This time was spent discussing all the individual test results, correlating them to the clinical relevance, counseling the patient and/or coordinating care         Dermatology Rooming Note    Nayely Wesley's goals for this visit include:   Chief Complaint   Patient presents with     SHELLIE Adler is here today for a patch test day 5      QASIM Decker        Again, thank you for allowing me to participate in the care of your patient.        Sincerely,        Royce De Leon MD

## 2022-11-30 ENCOUNTER — MEDICAL CORRESPONDENCE (OUTPATIENT)
Dept: ORTHOPEDICS | Facility: CLINIC | Age: 52
End: 2022-11-30

## 2022-11-30 ENCOUNTER — DOCUMENTATION ONLY (OUTPATIENT)
Dept: ORTHOPEDICS | Facility: CLINIC | Age: 52
End: 2022-11-30

## 2022-11-30 NOTE — PROGRESS NOTES
Royce De Leon  saw this patient in his allergy clinic for patch testing for possible allergic reaction to the implant left knee.   She reacted to Dimethylphthalate. I was asked by Dr. De Leon and Dr. Honeycutt to see if there was a connection between the chronic inflammation in the knee and constant small powder abrasio of the plastic part with maybe Phthalate contamination.   I was asked to find out if Phthalates were used in either of the productes used in the knee surgery in 9/2022.    I obtained the surgical report from [facility] in [place] and received the information on both the spacer ([employer]- #[phone] and #[phone]) and the cement ([employer]- #[phone]).    SPACER- I asked the [employer] to reach out to the . They received confirmation from the , [employer] [place], [place], that the InterSpace product, [id number], that was used for her procedure in 9/2022 does not contain Dimethylphthalate.     I have not heard back about the cement but have reached out again so hopefully will be hearing back from them soon.     BRADLEY Zelaya

## 2022-11-30 NOTE — PROGRESS NOTES
- I heard back from the [place] team and they sent me the following photo of the formulation of the cement:        - I have forward this information on to Dr. De Leon and Dr. Honeycutt.

## 2022-12-01 ENCOUNTER — TELEPHONE (OUTPATIENT)
Dept: ORTHOPEDICS | Facility: CLINIC | Age: 52
End: 2022-12-01

## 2022-12-01 NOTE — TELEPHONE ENCOUNTER
===View-only below this line===  ----- Message -----  From: Nathalie Daley RN  Sent: 11/30/2022   9:02 AM CST  To: Axel Honeycutt MD, Royce De Leon MD, *  Subject: RE: allergy to dimethylphtalate                  Hello,     I received information on the spacer used for this surgery. I asked the supplier to reach out to the . They received confirmation from the , [employer] [place], that the InterSpace product, [id number], that was used for her procedure in 9/2022 does not contain Dimethylphthalate.     I have not heard back about the cement but have reached out again so hopefully will be hearing back from them soon.     BRADLEY Zelaya   ----- Message -----  From: Royce De Leon MD  Sent: 11/28/2022   9:34 AM CST  To: Axel Honeycutt MD, Nathalie Daley RN, *  Subject: RE: allergy to dimethylphtalate                  Dear Axel    Thanks for the feed back and I would be very interested to know the result of your inquiries.    Kind regards    PB  ----- Message -----  From: Axel Honeycutt MD  Sent: 11/27/2022  10:39 PM CST  To: Nathalie Daley RN, Royce De Leon MD, *  Subject: RE: allergy to dimethylphtalate                  Royce,  I do not know the exact chemical composition of the antibiotic methylmethacrylate cement spacer that was placed in her knee.      Anna Marie/Nathalie,  Please call [facility] in [place] and get the implant stickers for the surgery of 9/2022 on this pt. We can then ask the  of the prefabricated cement spacer implant    Thank you.      ----- Message -----  From: Royce De Leon MD  Sent: 11/25/2022  11:10 AM CST  To: Axel Honeycutt MD  Subject: allergy to dimethylphtalate                      Rossy Reyna    I have seen this patient in my allergy clinic for patch testing for possible allergic reaction to the implant left knee.  Interestingly, she reacted to a product, that I haven't seen for years reacting. It is Dimethylphthalate and I am  wondering if there is a connection between the chronic inflammation in the knee and constant small powder abrasio of the plastic part with maybe Phthalate contamination. It would help us if we could find out if the inflamed tissue around the knee has eosinophils in there. This would be an indicator for delayed type reaction and then we would have to discuss what would be the treatment.   Could anybody find out if these phthalates are indeed in these plastics?    Any ideas and suggestions from your side?    Kind regards    Royce De Leon MD  Head of Dermato-Allergy Division  Myron Farias Professor in Dermatology  Dept of Dermatology  [address] [address]  [phone]

## 2022-12-08 ENCOUNTER — MEDICAL CORRESPONDENCE (OUTPATIENT)
Dept: HEALTH INFORMATION MANAGEMENT | Facility: CLINIC | Age: 52
End: 2022-12-08

## 2022-12-09 ENCOUNTER — TRANSFERRED RECORDS (OUTPATIENT)
Dept: HEALTH INFORMATION MANAGEMENT | Facility: CLINIC | Age: 52
End: 2022-12-09

## 2022-12-09 ENCOUNTER — MYC MEDICAL ADVICE (OUTPATIENT)
Dept: ORTHOPEDICS | Facility: CLINIC | Age: 52
End: 2022-12-09

## 2022-12-13 ENCOUNTER — MYC MEDICAL ADVICE (OUTPATIENT)
Dept: ORTHOPEDICS | Facility: CLINIC | Age: 52
End: 2022-12-13

## 2022-12-13 NOTE — TELEPHONE ENCOUNTER
- A call was placed to the patient.     - I communicated the following information:    I obtained the surgical report from [facility] in [place] and received the information on both the spacer ([employer]- #[phone] and #[phone]) and the cement ([employer]- #[phone]).     SPACER- I asked the [employer] to reach out to the . They received confirmation from the , [employer]aren gould, that the InterSpace product, [id number], that was used for her procedure in 9/2022 does not contain Dimethylphthalate.     CEMENT: Miller package information obtained. It seems to be mostly Acrylates and Tobramycine. This doesn't exclude contaminations with Phthalates.     -Patient told me Dr. De Leon mentioned doing a tissue test and was wondering if that still was going to be done.     -She also asked if what kind of implant Dr. Honeycutt uses. I told her I would ask and get back to her. She said Dr. Mar mentioned possibly needing a costume implant and wanted Dr. Honeycutt to be aware.     - Patient verbalized understanding of plan and all questions were answered. Call back number to clinic was given and patient was told to call if they had an further questions.

## 2023-01-03 DIAGNOSIS — T84.54XA INFECTION OF TOTAL LEFT KNEE REPLACEMENT, INITIAL ENCOUNTER (H): Primary | ICD-10-CM

## 2023-01-19 ENCOUNTER — OFFICE VISIT (OUTPATIENT)
Dept: ORTHOPEDICS | Facility: CLINIC | Age: 53
End: 2023-01-19
Payer: COMMERCIAL

## 2023-01-19 ENCOUNTER — TRANSFERRED RECORDS (OUTPATIENT)
Dept: HEALTH INFORMATION MANAGEMENT | Facility: CLINIC | Age: 53
End: 2023-01-19

## 2023-01-19 ENCOUNTER — ANCILLARY PROCEDURE (OUTPATIENT)
Dept: GENERAL RADIOLOGY | Facility: CLINIC | Age: 53
End: 2023-01-19
Attending: ORTHOPAEDIC SURGERY
Payer: COMMERCIAL

## 2023-01-19 VITALS — HEIGHT: 66 IN | BODY MASS INDEX: 44.2 KG/M2 | WEIGHT: 275 LBS

## 2023-01-19 DIAGNOSIS — T84.54XA INFECTION OF TOTAL LEFT KNEE REPLACEMENT, INITIAL ENCOUNTER (H): ICD-10-CM

## 2023-01-19 DIAGNOSIS — T84.54XA INFECTION OF TOTAL LEFT KNEE REPLACEMENT, INITIAL ENCOUNTER (H): Primary | ICD-10-CM

## 2023-01-19 LAB
APPEARANCE FLD: ABNORMAL
CELL COUNT BODY FLUID SOURCE: ABNORMAL
COLOR FLD: ABNORMAL
EOSINOPHIL NFR FLD MANUAL: 1 %
LYMPHOCYTES NFR FLD MANUAL: 10 %
MONOS+MACROS NFR FLD MANUAL: NORMAL %
NEUTS BAND NFR FLD MANUAL: 73 %
OTHER CELLS FLD MANUAL: 17 %
WBC # FLD AUTO: 1035 /UL

## 2023-01-19 PROCEDURE — 87070 CULTURE OTHR SPECIMN AEROBIC: CPT | Performed by: ORTHOPAEDIC SURGERY

## 2023-01-19 PROCEDURE — 20610 DRAIN/INJ JOINT/BURSA W/O US: CPT | Mod: LT | Performed by: ORTHOPAEDIC SURGERY

## 2023-01-19 PROCEDURE — 87075 CULTR BACTERIA EXCEPT BLOOD: CPT | Performed by: ORTHOPAEDIC SURGERY

## 2023-01-19 PROCEDURE — 89051 BODY FLUID CELL COUNT: CPT | Performed by: ORTHOPAEDIC SURGERY

## 2023-01-19 PROCEDURE — 99214 OFFICE O/P EST MOD 30 MIN: CPT | Mod: 25 | Performed by: ORTHOPAEDIC SURGERY

## 2023-01-19 PROCEDURE — 73562 X-RAY EXAM OF KNEE 3: CPT | Mod: LT | Performed by: RADIOLOGY

## 2023-01-19 RX ORDER — HYDROCORTISONE 5 MG/1
TABLET ORAL
COMMUNITY
Start: 2022-12-21

## 2023-01-19 RX ORDER — LIDOCAINE HYDROCHLORIDE 10 MG/ML
1 INJECTION, SOLUTION EPIDURAL; INFILTRATION; INTRACAUDAL; PERINEURAL
Status: SHIPPED | OUTPATIENT
Start: 2023-01-19

## 2023-01-19 RX ADMIN — LIDOCAINE HYDROCHLORIDE 1 ML: 10 INJECTION, SOLUTION EPIDURAL; INFILTRATION; INTRACAUDAL; PERINEURAL at 14:30

## 2023-01-19 NOTE — LETTER
"    1/19/2023         RE: Nayely Wesley  [address] [facility] [address]        Dear Colleague,    Thank you for referring your patient, Nayely Wesley, to the [facility] [place]. Please see a copy of my visit note below.        [facility]  Orthopaedic Surgery, Joint Replacement Consultation  by Axel Honeycutt M.D.    Nayely Wesley MRN# [id number]    YOB: 1970     Requesting physician: Ramu Lugo  No primary care provider on file.         Background history:  DX:  1. Bilateral knee osteoarthritis  2. Left knee PJI    TREATMENTS:  1. 12/19/2016, bilateral TKA (Parish), EH  2. 9/18/2022, L knee aspiration (Carlito), EH: 53k wbc, 90% neutrophils, no growth  3. 9/19/2022: L TKA explant, insertion of spacer (Carlito), EH. Cultures (-). Rocephin thru 11/1/2022,      Nora returns for reevaluation after completing antibiotic therapy.  Is been 2 months since her last antibiotic treatment.  She took amoxicillin for dental evaluation in mid November.  She has been feeling well and presents now for possible second stage implantation surgery.    She had numerous concerns about the etiology of her \"culture-negative\" infection whether this truly represented an infection or some other problem such as metal intolerance or metal allergy or cement allergy.  She had seen Dr. De Leon and had skin testing performed which demonstrated abnormal reaction to a chemical found in plastic substances.  This was not identified within methacrylate cement.  I do not know whether it is present high molecular weight polyethylene.  I gave the 's name to Dr. De Leon, however, I think the presence or absence of this substance within the methylmethacrylate or high molecular polyethylene is a mood issue as she will need to decide whether or not to proceed with a second stage reimplantation.  I informed her that all total knee implants will include a high " molecular weight polyethylene bearing surface.  Furthermore if they do not, it probably would be nearly impossible to know whether or not the implant came in substance with any plastic material containing the substance including the packaging material.  Furthermore, her right total knee arthroplasty has a high molecular weight polyethylene bearing and she is not showing evidence of a problem in that knee joint in the left knee joint did have problems for the 6 years that it was implanted within her.    Examination:  Slight increased warmth about the left knee joint.  Difficult to assess for any effusion due to body habitus.  0 to 58 degrees of knee flexion.  Some laxity of the collateral ligaments noted.    Radiographs demonstrate her articulating spacer in proper position without subluxation or other problem.           Assessment and Plan:   Assessment:  52-year-old female with left knee status post explant and insertion of spacer for either culture negative PJI or implant debris related inflammatory arthropathy.  Advised that she undergo aspiration of the knee to see whether or not there is evidence of any type of infection and if not, then proceeding with a second stage reimplantation would be an option.  She will need to decide whether or not to pursue second stage reimplantation.  We did did discuss other alternatives such as arthrodesis of the knee, retaining the articulating spacer as a permanent device, or amputation.  I also discussed with her the possibly obtaining second opinions as she is quite concerned about the allergy testing results which I cannot interpret with any confidence.     Plan:  1. Aspiration of the right knee performed today.  Under sterile precautions and 1% Xylocaine anesthesia, the left knee was aspirated from a medial parapatellar approach after unsuccessful lateral parapatellar approach attempt.  8 cc of serosanguineous semiclear fluid were aspirated and sent for cell count, culture,  synovasure testing.  2. I have asked patient to follow-up via MyChart as to whether or not she wishes to proceed with second stage reimplantation and any concerns she has with respect to the implant design, manufacture, material and or arthroplasty procedure.    MD Rg Crockett Family Professor  Oncology and Adult Reconstructive Surgery  Dept Orthopaedic Surgery, [facility]  [phone] office, [phone] pager  [email or web address]    Total combined visit time and work time before and after clinic visit on encounter date = 50 min      Large Joint Injection/Arthocentesis: L knee joint    Date/Time: 2023 2:30 PM  Performed by: Axel Honeycutt MD  Authorized by: Axel Honeycutt MD     Indications:  Diagnostic evaluation  Needle Size:  22 G  Location:  Knee      Medications:  1 mL lidocaine (PF) 1 %  Aspirate amount (mL):  8  Aspirate:  Blood-tinged  Aspirate analysis: sent for lab analysis    Outcome:  Tolerated well, no immediate complications  Procedure discussed: discussed risks, benefits, and alternatives    Consent Given by:  Patient and spouse  Timeout: timeout called immediately prior to procedure    Prep: patient was prepped and draped in usual sterile fashion       [facility] [address] [address] [phone]  Dept: [phone]  ______________________________________________________________________________    Patient: Nayely Wesley   : 1970   MRN: [id number]   2023    INVASIVE PROCEDURE SAFETY CHECKLIST    Date: 2023   Procedure:Left knee joint aspiration  Patient Name: Nayely Wesley  MRN: [id number]  YOB: 1970    Action: Complete sections as appropriate. Any discrepancy results in a HARD COPY until resolved.     PRE PROCEDURE:  Patient ID verified with 2 identifiers (name and  or MRN): Yes  Procedure and site verified with patient/designee (when able): Yes  Accurate  consent documentation in medical record: Yes  H&P (or appropriate assessment) documented in medical record: Yes  H&P must be up to 20 days prior to procedure and updates within 24 hours of procedure as applicable: NA  Relevant diagnostic and radiology test results appropriately labeled and displayed as applicable: NA  Procedure site(s) marked with provider initials: NA    TIMEOUT:  Time-Out performed immediately prior to starting procedure, including verbal and active participation of all team members addressing the following:Yes  * Correct patient identify  * Confirmed that the correct side and site are marked  * An accurate procedure consent form  * Agreement on the procedure to be done  * Correct patient position  * Relevant images and results are properly labeled and appropriately displayed  * The need to administer antibiotics or fluids for irrigation purposes during the procedure as applicable   * Safety precautions based on patient history or medication use    DURING PROCEDURE: Verification of correct person, site, and procedures any time the responsibility for care of the patient is transferred to another member of the care team.       The following medications were given:         Prior to injection, verified patient identity using patient's name and date of birth.  Due to injection administration, patient instructed to remain in clinic for 15 minutes  afterwards, and to report any adverse reaction to me immediately.    Joint injection was performed.    Medication Name: Lidocaine NDC 47494-070-30  Drug Amount Wasted:  None.  Vial/Syringe: Single dose vial  Expiration Date:  09/01/2026        Scribed by John Gomez, EMT for Dr. Honeycutt on January 19, 2023 at 2:34 PM based on the provider's statements to me.     John Gomez, EMT

## 2023-01-19 NOTE — PROGRESS NOTES
Large Joint Injection/Arthocentesis: L knee joint    Date/Time: 2023 2:30 PM  Performed by: Axel Honeycutt MD  Authorized by: Axel Honeycutt MD     Indications:  Diagnostic evaluation  Needle Size:  22 G  Location:  Knee      Medications:  1 mL lidocaine (PF) 1 %  Aspirate amount (mL):  8  Aspirate:  Blood-tinged  Aspirate analysis: sent for lab analysis    Outcome:  Tolerated well, no immediate complications  Procedure discussed: discussed risks, benefits, and alternatives    Consent Given by:  Patient and spouse  Timeout: timeout called immediately prior to procedure    Prep: patient was prepped and draped in usual sterile fashion       [facility] ORTH[facility] [address]  [address] [facility] [id number] [phone]  Dept: [phone]  ______________________________________________________________________________    Patient: Nayely Wesley   : 1970   MRN: [id number]   2023    INVASIVE PROCEDURE SAFETY CHECKLIST    Date: 2023   Procedure:Left knee joint aspiration  Patient Name: Nayely Wesley  MRN: [id number]  YOB: 1970    Action: Complete sections as appropriate. Any discrepancy results in a HARD COPY until resolved.     PRE PROCEDURE:  Patient ID verified with 2 identifiers (name and  or MRN): Yes  Procedure and site verified with patient/designee (when able): Yes  Accurate consent documentation in medical record: Yes  H&P (or appropriate assessment) documented in medical record: Yes  H&P must be up to 20 days prior to procedure and updates within 24 hours of procedure as applicable: NA  Relevant diagnostic and radiology test results appropriately labeled and displayed as applicable: NA  Procedure site(s) marked with provider initials: NA    TIMEOUT:  Time-Out performed immediately prior to starting procedure, including verbal and active participation of all team members addressing the following:Yes  * Correct patient  identify  * Confirmed that the correct side and site are marked  * An accurate procedure consent form  * Agreement on the procedure to be done  * Correct patient position  * Relevant images and results are properly labeled and appropriately displayed  * The need to administer antibiotics or fluids for irrigation purposes during the procedure as applicable   * Safety precautions based on patient history or medication use    DURING PROCEDURE: Verification of correct person, site, and procedures any time the responsibility for care of the patient is transferred to another member of the care team.       The following medications were given:         Prior to injection, verified patient identity using patient's name and date of birth.  Due to injection administration, patient instructed to remain in clinic for 15 minutes  afterwards, and to report any adverse reaction to me immediately.    Joint injection was performed.    Medication Name: Lidocaine NDC 37318-308-23  Drug Amount Wasted:  None.  Vial/Syringe: Single dose vial  Expiration Date:  09/01/2026        Scribed by John Gomez, EMT for Dr. Honeycutt on January 19, 2023 at 2:34 PM based on the provider's statements to me.     John Gomez, EMT

## 2023-01-19 NOTE — PROGRESS NOTES
"    [facility]  Orthopaedic Surgery, Joint Replacement Consultation  by Axel Honeycutt M.D.    Nayely Wesley MRN# [id number]    YOB: 1970     Requesting physician: Ramu Lugo  No primary care provider on file.         Background history:  DX:  1. Bilateral knee osteoarthritis  2. Left knee PJI    TREATMENTS:  1. 12/19/2016, bilateral TKA ([facility]), EH  2. 9/18/2022, L knee aspiration (Carlito), EH: 53k wbc, 90% neutrophils, no growth  3. 9/19/2022: L TKA explant, insertion of spacer (Carlito), EH. Cultures (-). Rocephin thru 11/1/2022,      Nora returns for reevaluation after completing antibiotic therapy.  Is been 2 months since her last antibiotic treatment.  She took amoxicillin for dental evaluation in mid November.  She has been feeling well and presents now for possible second stage implantation surgery.    She had numerous concerns about the etiology of her \"culture-negative\" infection whether this truly represented an infection or some other problem such as metal intolerance or metal allergy or cement allergy.  She had seen Dr. De Leon and had skin testing performed which demonstrated abnormal reaction to a chemical found in plastic substances.  This was not identified within methacrylate cement.  I do not know whether it is present high molecular weight polyethylene.  I gave the 's name to Dr. De Leon, however, I think the presence or absence of this substance within the methylmethacrylate or high molecular polyethylene is a mood issue as she will need to decide whether or not to proceed with a second stage reimplantation.  I informed her that all total knee implants will include a high molecular weight polyethylene bearing surface.  Furthermore if they do not, it probably would be nearly impossible to know whether or not the implant came in substance with any plastic material containing the substance including the packaging material.  Furthermore, " her right total knee arthroplasty has a high molecular weight polyethylene bearing and she is not showing evidence of a problem in that knee joint in the left knee joint did have problems for the 6 years that it was implanted within her.    Examination:  Slight increased warmth about the left knee joint.  Difficult to assess for any effusion due to body habitus.  0 to 58 degrees of knee flexion.  Some laxity of the collateral ligaments noted.    Radiographs demonstrate her articulating spacer in proper position without subluxation or other problem.           Assessment and Plan:   Assessment:  52-year-old female with left knee status post explant and insertion of spacer for either culture negative PJI or implant debris related inflammatory arthropathy.  Advised that she undergo aspiration of the knee to see whether or not there is evidence of any type of infection and if not, then proceeding with a second stage reimplantation would be an option.  She will need to decide whether or not to pursue second stage reimplantation.  We did did discuss other alternatives such as arthrodesis of the knee, retaining the articulating spacer as a permanent device, or amputation.  I also discussed with her the possibly obtaining second opinions as she is quite concerned about the allergy testing results which I cannot interpret with any confidence.     Plan:  1. Aspiration of the left knee performed today.  Under sterile precautions and 1% Xylocaine anesthesia, the left knee was aspirated from a medial parapatellar approach after unsuccessful lateral parapatellar approach attempt.  8 cc of serosanguineous semiclear fluid were aspirated and sent for cell count, culture, synovasure testing.  2. I [facility] whether or not she wishes to proceed with second stage reimplantation and any concerns she has with respect to the implant design, manufacture, material and or arthroplasty  procedure.    MD Rg Crockett Family Professor  Oncology and Adult Reconstructive Surgery  Dept Orthopaedic Surgery, [facility]  [phone] office, [phone] pager  [email or web address]    Total combined visit time and work time before and after clinic visit on encounter date = 50 min

## 2023-01-19 NOTE — NURSING NOTE
"Chief Complaint   Patient presents with     RECHECK     Periprosthetic knee infection following surgery. Pt states that they switched from Prednisone to hydrocortisone on the recommendation from their endocrinologist. Pt states their last antibiotic was 11/23/2022 for their dentist appointment.        52 year old  1970    Ht 1.676 m (5' 6\")   Wt 124.7 kg (275 lb)   BMI 44.39 kg/m      No past surgical history on file.       Pain Assessment  Patient Currently in Pain: Sri              [id number] [place] [facility] [address] [address] [place] [address]        Allergies   Allergen Reactions     Bupropion Hives     Hydrocodone-Acetaminophen Anaphylaxis     Pt had taken Vicodin, norflex and medrol all at same time  Pt had taken Vicodin, norflex and medrol all at same time  Pt had taken Vicodin, norflex and medrol all at same time       Other Drug Allergy (See Comments)      Diamethylphalate     Gadolinium Derivatives Swelling     Cat Hair Extract Other (See Comments)     Difficult breathing     No Clinical Screening - See Comments      2009 - had Medrol pack, vicodin and norflex  together for pain med treatment - had tongue swell,  face swell and SOB symptoms - has taken each separate without problem. Do not use together.      Adhesive Tape Rash     Band aids. In patch tests reaction to Dimethylphthalate           Current Outpatient Medications   Medication     aspirin (ASA) 325 MG tablet     busPIRone (BUSPAR) 15 MG tablet     cetirizine (ZYRTEC) 10 MG tablet     cholecalciferol 50 MCG (2000 UT) CAPS     hydrOXYzine (VISTARIL) 25 MG capsule     losartan (COZAAR) 25 MG tablet     Magnesium 400 MG TABS     metFORMIN (GLUCOPHAGE XR) 500 MG 24 hr tablet     methocarbamol (ROBAXIN) 500 MG tablet     oxyCODONE (ROXICODONE) 5 MG tablet     POTASSIUM CHLORIDE ER PO     Probiotic Product (PROBIOTIC-10 PO)     senna-docusate (SENOKOT-S/PERICOLACE) 8.6-50 MG tablet     spironolactone " (ALDACTONE) 25 MG tablet     torsemide (DEMADEX) 20 MG tablet     traZODone (DESYREL) 50 MG tablet     amoxicillin (AMOXIL) 500 MG capsule     hydrocortisone (CORTEF) 5 MG tablet     linaclotide (LINZESS) 290 MCG capsule     metroNIDAZOLE (METROCREAM) 0.75 % external cream     Omega-3 Fatty Acids (FISH OIL BURP-LESS) 500 MG CAPS     predniSONE (DELTASONE) 5 MG tablet     No current facility-administered medications for this visit.             Questionnaires:    HOOS Hip Dysfunction & Osteoarthritis Outcome Questionnaire    No flowsheet data found.           KOOS Knee Survey Assessment    Knee Outcome Survey ADL Scale (David, DANE; JACKIE Owens; Nohelia, RS; Alvaro, FH; JOSE Nieto; 1998) 11/7/2022   Do you have swelling in your knee? Sometimes   Do you feel grinding, hear clicking or any other type of noise when your knee moves? Always   Does your knee catch or hang up when moving? Always   Can you straighten your knee fully? Always   Can you bend your knee fully? Rarely   How severe is your knee joint stiffness after first wakening in the morning? Mild   How severe is your knee stiffness after sitting, lying or resting LATER IN THE DAY? Mild   How often do you experience knee pain? Daily   Twisting/pivoting on your knee Severe   Straightening knee fully None   Bending knee fully Severe   Walking on flat surface Moderate   Going up or down stairs Mild   At night while in bed Moderate   Sitting or lying Moderate   Standing upright Moderate   Descending stairs Mild   Ascending stairs Moderate   Rising from sitting Moderate   Standing Moderate   Bending to floor/ an object Extreme   Walking on flat surface Moderate   Getting in/out of car Moderate   Going shopping Extreme   Putting on socks/stockings Mild   Rising from bed Moderate   Taking off socks/stockings Mild   Lying in bed (turning over, maintaining knee position) Moderate   Getting in/out of bath Moderate   Sitting Mild   Getting on/off toilet Moderate    Heavy domestic duties (moving heavy boxes, scrubbing floors, etc) Extreme   Light domestic duties (cooking, dusting, etc) Extreme   Squatting Extreme   Running Extreme   Jumping Extreme   Twisting/pivoting on your injured knee Extreme   Kneeling Extreme   How often are you aware of your knee problem? Constantly   Have you modified your life style to avoid potentially damaging activities to your knee? Totally   How much are you troubled with lack of confidence in your knee? Severely   In general, how much difficulty do you have with your knee? Extreme   Pain Score 50   Symptoms Score 39.29   Function, Daily Living Score 44.12   Sports and Rec Score 0   Quality of Life Score 6.25              Promis 10 Assessment    PROMIS 10 11/7/2022   In general, would you say your health is: Good   In general, would you say your quality of life is: Very good   In general, how would you rate your physical health? Poor   In general, how would you rate your mental health, including your mood and your ability to think? Good   In general, how would you rate your satisfaction with your social activities and relationships? Good   In general, please rate how well you carry out your usual social activities and roles Good   To what extent are you able to carry out your everyday physical activities such as walking, climbing stairs, carrying groceries, or moving a chair? A little   How often have you been bothered by emotional problems such as feeling anxious, depressed or irritable? Sometimes   How would you rate your fatigue on average? Mild   How would you rate your pain on average?   0 = No Pain  to  10 = Worst Imaginable Pain 8   In general, would you say your health is: 3   In general, would you say your quality of life is: 4   In general, how would you rate your physical health? 1   In general, how would you rate your mental health, including your mood and your ability to think? 3   In general, how would you rate your satisfaction  with your social activities and relationships? 3   In general, please rate how well you carry out your usual social activities and roles. (This includes activities at home, at work and in your community, and responsibilities as a parent, child, spouse, employee, friend, etc.) 3   To what extent are you able to carry out your everyday physical activities such as walking, climbing stairs, carrying groceries, or moving a chair? 2   In the past 7 days, how often have you been bothered by emotional problems such as feeling anxious, depressed, or irritable? 3   In the past 7 days, how would you rate your fatigue on average? 2   In the past 7 days, how would you rate your pain on average, where 0 means no pain, and 10 means worst imaginable pain? 8   Global Mental Health Score 13   Global Physical Health Score 9   PROMIS TOTAL - SUBSCORES 22              Ortho [place] Knee Questionnaire    No flowsheet data found.

## 2023-01-23 LAB — SCANNED LAB RESULT: ABNORMAL

## 2023-01-24 LAB — BACTERIA SNV CULT: NO GROWTH

## 2023-01-30 ENCOUNTER — HEALTH MAINTENANCE LETTER (OUTPATIENT)
Age: 53
End: 2023-01-30

## 2023-02-02 LAB — BACTERIA SNV CULT: NORMAL

## 2023-10-18 NOTE — PROCEDURE: MOHS SURGERY
Continue Regimen: Humira 80 mg every two weeks \\nClindamycin lotion QD\\nOTC BPO wash Detail Level: Simple Render In Strict Bullet Format?: No Initiate Treatment: metformin 500 mg BID Purse String (Simple) Text: Given the location of the defect and the characteristics of the surrounding skin a pursestring closure was deemed most appropriate.  Undermining was performed circumfirentially around the surgical defect.  A purstring suture was then placed and tightened.

## 2024-03-02 ENCOUNTER — HEALTH MAINTENANCE LETTER (OUTPATIENT)
Age: 54
End: 2024-03-02

## 2025-02-15 ENCOUNTER — HEALTH MAINTENANCE LETTER (OUTPATIENT)
Age: 55
End: 2025-02-15

## 2025-03-15 ENCOUNTER — HEALTH MAINTENANCE LETTER (OUTPATIENT)
Age: 55
End: 2025-03-15

## (undated) RX ORDER — LIDOCAINE HYDROCHLORIDE 10 MG/ML
INJECTION, SOLUTION EPIDURAL; INFILTRATION; INTRACAUDAL; PERINEURAL
Status: DISPENSED
Start: 2023-01-19